# Patient Record
Sex: MALE | Race: WHITE | Employment: PART TIME | URBAN - METROPOLITAN AREA
[De-identification: names, ages, dates, MRNs, and addresses within clinical notes are randomized per-mention and may not be internally consistent; named-entity substitution may affect disease eponyms.]

---

## 2019-05-15 ENCOUNTER — APPOINTMENT (OUTPATIENT)
Dept: CT IMAGING | Age: 34
End: 2019-05-15
Payer: COMMERCIAL

## 2019-05-15 ENCOUNTER — HOSPITAL ENCOUNTER (EMERGENCY)
Age: 34
Discharge: HOME | End: 2019-05-15
Attending: EMERGENCY MEDICINE
Payer: COMMERCIAL

## 2019-05-15 VITALS
SYSTOLIC BLOOD PRESSURE: 125 MMHG | DIASTOLIC BLOOD PRESSURE: 80 MMHG | OXYGEN SATURATION: 97 % | WEIGHT: 160 LBS | HEIGHT: 74 IN | TEMPERATURE: 98.3 F | RESPIRATION RATE: 16 BRPM | HEART RATE: 91 BPM | BODY MASS INDEX: 20.53 KG/M2

## 2019-05-15 DIAGNOSIS — G44.209 TENSION HEADACHE: ICD-10-CM

## 2019-05-15 DIAGNOSIS — Z87.828 HISTORY OF HEAD INJURY: ICD-10-CM

## 2019-05-15 DIAGNOSIS — R10.9 FLANK PAIN: Primary | ICD-10-CM

## 2019-05-15 LAB
ALBUMIN SERPL-MCNC: 4 G/DL (ref 3.5–5.2)
ALP BLD-CCNC: 85 U/L (ref 40–129)
ALT SERPL-CCNC: 31 U/L (ref 0–40)
ANION GAP SERPL CALCULATED.3IONS-SCNC: 13 MMOL/L (ref 7–16)
AST SERPL-CCNC: 32 U/L (ref 0–39)
BACTERIA: ABNORMAL /HPF
BILIRUB SERPL-MCNC: 0.3 MG/DL (ref 0–1.2)
BILIRUBIN URINE: NEGATIVE
BLOOD, URINE: ABNORMAL
BUN BLDV-MCNC: 8 MG/DL (ref 6–20)
CALCIUM SERPL-MCNC: 9.3 MG/DL (ref 8.6–10.2)
CHLORIDE BLD-SCNC: 99 MMOL/L (ref 98–107)
CLARITY: CLEAR
CO2: 25 MMOL/L (ref 22–29)
COLOR: YELLOW
CREAT SERPL-MCNC: 0.9 MG/DL (ref 0.7–1.2)
GFR AFRICAN AMERICAN: >60
GFR NON-AFRICAN AMERICAN: >60 ML/MIN/1.73
GLUCOSE BLD-MCNC: 106 MG/DL (ref 74–99)
GLUCOSE URINE: NEGATIVE MG/DL
HCT VFR BLD CALC: 32.1 % (ref 37–54)
HEMOGLOBIN: 10.9 G/DL (ref 12.5–16.5)
KETONES, URINE: NEGATIVE MG/DL
LEUKOCYTE ESTERASE, URINE: ABNORMAL
MCH RBC QN AUTO: 28.1 PG (ref 26–35)
MCHC RBC AUTO-ENTMCNC: 34 % (ref 32–34.5)
MCV RBC AUTO: 82.7 FL (ref 80–99.9)
NITRITE, URINE: NEGATIVE
PDW BLD-RTO: 13.7 FL (ref 11.5–15)
PH UA: 6 (ref 5–9)
PLATELET # BLD: 269 E9/L (ref 130–450)
PMV BLD AUTO: 10.1 FL (ref 7–12)
POTASSIUM SERPL-SCNC: 3.2 MMOL/L (ref 3.5–5)
PROTEIN UA: 30 MG/DL
RBC # BLD: 3.88 E12/L (ref 3.8–5.8)
RBC UA: ABNORMAL /HPF (ref 0–2)
SODIUM BLD-SCNC: 137 MMOL/L (ref 132–146)
SPECIFIC GRAVITY UA: 1.02 (ref 1–1.03)
TOTAL PROTEIN: 7.8 G/DL (ref 6.4–8.3)
TROPONIN: <0.01 NG/ML (ref 0–0.03)
UROBILINOGEN, URINE: 1 E.U./DL
WBC # BLD: 9.3 E9/L (ref 4.5–11.5)
WBC UA: ABNORMAL /HPF (ref 0–5)
YEAST: ABNORMAL

## 2019-05-15 PROCEDURE — 99284 EMERGENCY DEPT VISIT MOD MDM: CPT

## 2019-05-15 PROCEDURE — 84484 ASSAY OF TROPONIN QUANT: CPT

## 2019-05-15 PROCEDURE — 2580000003 HC RX 258: Performed by: EMERGENCY MEDICINE

## 2019-05-15 PROCEDURE — 85027 COMPLETE CBC AUTOMATED: CPT

## 2019-05-15 PROCEDURE — 96374 THER/PROPH/DIAG INJ IV PUSH: CPT

## 2019-05-15 PROCEDURE — 36415 COLL VENOUS BLD VENIPUNCTURE: CPT

## 2019-05-15 PROCEDURE — 70450 CT HEAD/BRAIN W/O DYE: CPT

## 2019-05-15 PROCEDURE — 93005 ELECTROCARDIOGRAM TRACING: CPT | Performed by: NURSE PRACTITIONER

## 2019-05-15 PROCEDURE — 6360000002 HC RX W HCPCS: Performed by: EMERGENCY MEDICINE

## 2019-05-15 PROCEDURE — 80053 COMPREHEN METABOLIC PANEL: CPT

## 2019-05-15 PROCEDURE — 6370000000 HC RX 637 (ALT 250 FOR IP): Performed by: EMERGENCY MEDICINE

## 2019-05-15 PROCEDURE — 74176 CT ABD & PELVIS W/O CONTRAST: CPT

## 2019-05-15 PROCEDURE — 81001 URINALYSIS AUTO W/SCOPE: CPT

## 2019-05-15 RX ORDER — 0.9 % SODIUM CHLORIDE 0.9 %
1000 INTRAVENOUS SOLUTION INTRAVENOUS ONCE
Status: COMPLETED | OUTPATIENT
Start: 2019-05-15 | End: 2019-05-15

## 2019-05-15 RX ORDER — IBUPROFEN 400 MG/1
600 TABLET ORAL ONCE
Status: COMPLETED | OUTPATIENT
Start: 2019-05-15 | End: 2019-05-15

## 2019-05-15 RX ORDER — IBUPROFEN 600 MG/1
600 TABLET ORAL EVERY 8 HOURS PRN
Qty: 30 TABLET | Refills: 1 | Status: SHIPPED | OUTPATIENT
Start: 2019-05-15 | End: 2019-11-12 | Stop reason: ALTCHOICE

## 2019-05-15 RX ORDER — POTASSIUM CHLORIDE 20 MEQ/1
40 TABLET, EXTENDED RELEASE ORAL ONCE
Status: COMPLETED | OUTPATIENT
Start: 2019-05-15 | End: 2019-05-15

## 2019-05-15 RX ORDER — KETOROLAC TROMETHAMINE 30 MG/ML
30 INJECTION, SOLUTION INTRAMUSCULAR; INTRAVENOUS ONCE
Status: COMPLETED | OUTPATIENT
Start: 2019-05-15 | End: 2019-05-15

## 2019-05-15 RX ADMIN — IBUPROFEN 600 MG: 400 TABLET, FILM COATED ORAL at 19:05

## 2019-05-15 RX ADMIN — POTASSIUM CHLORIDE 40 MEQ: 20 TABLET, EXTENDED RELEASE ORAL at 19:04

## 2019-05-15 RX ADMIN — SODIUM CHLORIDE 1000 ML: 9 INJECTION, SOLUTION INTRAVENOUS at 17:22

## 2019-05-15 RX ADMIN — KETOROLAC TROMETHAMINE 30 MG: 30 INJECTION INTRAMUSCULAR; INTRAVENOUS at 17:22

## 2019-05-15 ASSESSMENT — PAIN DESCRIPTION - PAIN TYPE: TYPE: ACUTE PAIN

## 2019-05-15 ASSESSMENT — PAIN SCALES - GENERAL
PAINLEVEL_OUTOF10: 6
PAINLEVEL_OUTOF10: 7
PAINLEVEL_OUTOF10: 3

## 2019-05-15 ASSESSMENT — PAIN DESCRIPTION - LOCATION: LOCATION: FLANK;HEAD

## 2019-05-15 ASSESSMENT — PAIN DESCRIPTION - ORIENTATION: ORIENTATION: LEFT

## 2019-05-15 ASSESSMENT — PAIN DESCRIPTION - FREQUENCY: FREQUENCY: CONTINUOUS

## 2019-05-15 ASSESSMENT — PAIN DESCRIPTION - DESCRIPTORS: DESCRIPTORS: ACHING
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
## 2019-05-15 NOTE — LETTER
[facility] Emergency Department  [address] [facility] [address]  Phone: [phone]               May 15, 2019    Patient: Rick Zaldivar   YOB: 1985   Date of Visit: 5/15/2019       To Whom It May Concern:    Mya Zabala was seen and treated in our emergency department on 5/15/2019. He may return to work on 5/18/19.       Sincerely,       Susan Locke RN         Signature:__________________________________

## 2019-05-16 LAB
EKG ATRIAL RATE: 91 BPM
EKG P AXIS: 71 DEGREES
EKG P-R INTERVAL: 150 MS
EKG Q-T INTERVAL: 352 MS
EKG QRS DURATION: 100 MS
EKG QTC CALCULATION (BAZETT): 432 MS
EKG R AXIS: 82 DEGREES
EKG T AXIS: 55 DEGREES
EKG VENTRICULAR RATE: 91 BPM

## 2019-05-16 PROCEDURE — 93010 ELECTROCARDIOGRAM REPORT: CPT | Performed by: INTERNAL MEDICINE

## 2019-11-12 ENCOUNTER — HOSPITAL ENCOUNTER (EMERGENCY)
Age: 34
Discharge: HOME | End: 2019-11-12
Attending: EMERGENCY MEDICINE
Payer: COMMERCIAL

## 2019-11-12 VITALS
DIASTOLIC BLOOD PRESSURE: 84 MMHG | SYSTOLIC BLOOD PRESSURE: 134 MMHG | HEART RATE: 90 BPM | WEIGHT: 165 LBS | OXYGEN SATURATION: 99 % | TEMPERATURE: 98.4 F | HEIGHT: 73 IN | RESPIRATION RATE: 16 BRPM | BODY MASS INDEX: 21.87 KG/M2

## 2019-11-12 DIAGNOSIS — S76.311A STRAIN OF RIGHT HAMSTRING MUSCLE, INITIAL ENCOUNTER: Primary | ICD-10-CM

## 2019-11-12 PROCEDURE — 99282 EMERGENCY DEPT VISIT SF MDM: CPT

## 2019-11-12 RX ORDER — NAPROXEN 500 MG/1
500 TABLET ORAL 2 TIMES DAILY PRN
Qty: 14 TABLET | Refills: 0 | Status: SHIPPED | OUTPATIENT
Start: 2019-11-12 | End: 2021-01-13 | Stop reason: ALTCHOICE

## 2019-11-12 ASSESSMENT — PAIN SCALES - GENERAL: PAINLEVEL_OUTOF10: 4

## 2019-11-12 ASSESSMENT — PAIN DESCRIPTION - FREQUENCY: FREQUENCY: CONTINUOUS

## 2019-11-12 ASSESSMENT — PAIN DESCRIPTION - LOCATION: LOCATION: LEG

## 2019-11-12 ASSESSMENT — PAIN DESCRIPTION - ORIENTATION: ORIENTATION: RIGHT

## 2019-11-12 ASSESSMENT — PAIN DESCRIPTION - PAIN TYPE: TYPE: ACUTE PAIN

## 2019-11-12 ASSESSMENT — PAIN DESCRIPTION - DESCRIPTORS: DESCRIPTORS: ACHING;THROBBING

## 2020-01-03 ENCOUNTER — APPOINTMENT (OUTPATIENT)
Dept: GENERAL RADIOLOGY | Age: 35
End: 2020-01-03
Payer: COMMERCIAL

## 2020-01-03 ENCOUNTER — HOSPITAL ENCOUNTER (EMERGENCY)
Age: 35
Discharge: HOME | End: 2020-01-03
Payer: COMMERCIAL

## 2020-01-03 VITALS
TEMPERATURE: 98.8 F | SYSTOLIC BLOOD PRESSURE: 133 MMHG | WEIGHT: 170 LBS | HEART RATE: 104 BPM | RESPIRATION RATE: 14 BRPM | HEIGHT: 73 IN | DIASTOLIC BLOOD PRESSURE: 84 MMHG | OXYGEN SATURATION: 96 % | BODY MASS INDEX: 22.53 KG/M2

## 2020-01-03 PROCEDURE — 6370000000 HC RX 637 (ALT 250 FOR IP): Performed by: NURSE PRACTITIONER

## 2020-01-03 PROCEDURE — 73060 X-RAY EXAM OF HUMERUS: CPT

## 2020-01-03 PROCEDURE — 99283 EMERGENCY DEPT VISIT LOW MDM: CPT

## 2020-01-03 RX ORDER — IBUPROFEN 800 MG/1
800 TABLET ORAL ONCE
Status: COMPLETED | OUTPATIENT
Start: 2020-01-03 | End: 2020-01-03

## 2020-01-03 RX ADMIN — IBUPROFEN 800 MG: 800 TABLET, FILM COATED ORAL at 20:41

## 2020-01-03 ASSESSMENT — PAIN SCALES - GENERAL: PAINLEVEL_OUTOF10: 6

## 2020-01-04 NOTE — ED PROVIDER NOTES
[facility]   HPI:  1/3/20, Time: 7:37 PM         Lissette Awan is a 30 y.o. male presenting to the ED for right shoulder pain. Patient reports that on December 30 he had an Invega injection, and reports that since then he is having pain at the injection site. There is no unusual erythema, firmness, drainage and patient also denies fevers. Patient denies taking any Motrin or Tylenol denies applying ice. Patient also denies any numbness or tingling to that right arm and there is no unusual pallor or paresthesia. Review of Systems:   Pertinent positives and negatives are stated within HPI, all other systems reviewed and are negative.          --------------------------------------------- PAST HISTORY ---------------------------------------------  Past Medical History:  has a past medical history of ADD (attention deficit disorder) and Bipolar 1 disorder ([facility] [address] Past Surgical History:  has no past surgical history on file. Social History:  reports that he has been smoking cigarettes. He has been smoking about 0.50 packs per day. He has never used smokeless tobacco. He reports current alcohol use. He reports that he does not use drugs. Family History: family history is not on file. The patients home medications have been reviewed. Allergies: Patient has no known allergies. -------------------------------------------------- RESULTS -------------------------------------------------  All laboratory and radiology results have been personally reviewed by myself   LABS:  No results found for this visit on 01/03/20. RADIOLOGY:  Interpreted by Radiologist.  XR HUMERUS RIGHT (MIN 2 VIEWS)   Final Result   No fracture or dislocation.                      ------------------------- NURSING NOTES AND VITALS REVIEWED ---------------------------   The nursing notes within the ED encounter and vital signs as below have been reviewed.    Pulse 129   SpO2 94%   Oxygen Saturation Interpretation: Normal      ---------------------------------------------------PHYSICAL EXAM--------------------------------------      Constitutional/General: Alert and oriented x3, well appearing, non toxic in NAD  Head: Normocephalic and atraumatic  Eyes: PERRL, EOMI  Mouth: Oropharynx clear, handling secretions, no trismus  Neck: Supple, full ROM,   Pulmonary: Lungs clear to auscultation bilaterally, no wheezes, rales, or rhonchi. Not in respiratory distress  Cardiovascular:  Regular rate and rhythm, no murmurs, gallops, or rubs. 2+ distal pulses  Abdomen: Soft, non tender, non distended,   Extremities: Moves all extremities x 4. Warm and well perfused  Skin: warm and dry without rash, no unusual erythema, swelling, firmness, concerning abscess or any other acute infectious or etiology process identified related to the injection site. Patient without any streaking there is no warmth palpated either. Does have full motor movement. Right radial pulse strong at 2+. Neurologic: GCS 15,  Psych: Normal Affect      ------------------------------ ED COURSE/MEDICAL DECISION MAKING----------------------  Medications   ibuprofen (ADVIL;MOTRIN) tablet 800 mg (800 mg Oral Given 1/3/20 2041)         ED COURSE:       Medical Decision Making: We will obtain imaging to rule out any underlying infectious process. Patient without any erythema, joint swelling, warmth or any drainage or streaking noted. There is no suspicion of any abscess formation. X-ray negative, patient provided ice as well as Motrin. Patient made aware of good follow-up care likely irritation from injection site. Patient otherwise nontoxic, patient expressed understanding and safely discharged home       Counseling: The emergency provider has spoken with the patient and discussed todays results, in addition to providing specific details for the plan of care and counseling regarding the diagnosis and prognosis.   Questions are answered at this time and they are agreeable with the plan.      --------------------------------- IMPRESSION AND DISPOSITION ---------------------------------    IMPRESSION  1. Pain at injection site, initial encounter        DISPOSITION  Disposition: Discharge to home  Patient condition is good      NOTE: This report was transcribed using voice recognition software.  Every effort was made to ensure accuracy; however, inadvertent computerized transcription errors may be present     BardalesOC Saleem CNP  01/04/20 1837

## 2021-01-13 ENCOUNTER — HOSPITAL ENCOUNTER (EMERGENCY)
Age: 36
Discharge: HOME | End: 2021-01-13
Attending: EMERGENCY MEDICINE
Payer: COMMERCIAL

## 2021-01-13 VITALS
WEIGHT: 190 LBS | SYSTOLIC BLOOD PRESSURE: 112 MMHG | OXYGEN SATURATION: 98 % | HEIGHT: 74 IN | RESPIRATION RATE: 18 BRPM | DIASTOLIC BLOOD PRESSURE: 77 MMHG | BODY MASS INDEX: 24.38 KG/M2 | TEMPERATURE: 97.3 F | HEART RATE: 93 BPM

## 2021-01-13 DIAGNOSIS — R10.12 LEFT UPPER QUADRANT ABDOMINAL PAIN: Primary | ICD-10-CM

## 2021-01-13 LAB
ALBUMIN SERPL-MCNC: 4.4 G/DL (ref 3.5–5.2)
ALP BLD-CCNC: 73 U/L (ref 40–129)
ALT SERPL-CCNC: 12 U/L (ref 0–40)
ANION GAP SERPL CALCULATED.3IONS-SCNC: 9 MMOL/L (ref 7–16)
AST SERPL-CCNC: 18 U/L (ref 0–39)
BASOPHILS ABSOLUTE: 0.04 E9/L (ref 0–0.2)
BASOPHILS RELATIVE PERCENT: 0.8 % (ref 0–2)
BILIRUB SERPL-MCNC: 0.2 MG/DL (ref 0–1.2)
BUN BLDV-MCNC: 12 MG/DL (ref 6–20)
CALCIUM SERPL-MCNC: 9.8 MG/DL (ref 8.6–10.2)
CHLORIDE BLD-SCNC: 104 MMOL/L (ref 98–107)
CO2: 24 MMOL/L (ref 22–29)
CREAT SERPL-MCNC: 0.9 MG/DL (ref 0.7–1.2)
EOSINOPHILS ABSOLUTE: 0.02 E9/L (ref 0.05–0.5)
EOSINOPHILS RELATIVE PERCENT: 0.4 % (ref 0–6)
GFR AFRICAN AMERICAN: >60
GFR NON-AFRICAN AMERICAN: >60 ML/MIN/1.73
GLUCOSE BLD-MCNC: 100 MG/DL (ref 74–99)
HCT VFR BLD CALC: 39.1 % (ref 37–54)
HEMOGLOBIN: 13.2 G/DL (ref 12.5–16.5)
IMMATURE GRANULOCYTES #: 0.03 E9/L
IMMATURE GRANULOCYTES %: 0.6 % (ref 0–5)
LIPASE: 9 U/L (ref 13–60)
LYMPHOCYTES ABSOLUTE: 2.3 E9/L (ref 1.5–4)
LYMPHOCYTES RELATIVE PERCENT: 43.9 % (ref 20–42)
MCH RBC QN AUTO: 28.9 PG (ref 26–35)
MCHC RBC AUTO-ENTMCNC: 33.8 % (ref 32–34.5)
MCV RBC AUTO: 85.6 FL (ref 80–99.9)
MONOCYTES ABSOLUTE: 0.46 E9/L (ref 0.1–0.95)
MONOCYTES RELATIVE PERCENT: 8.8 % (ref 2–12)
NEUTROPHILS ABSOLUTE: 2.39 E9/L (ref 1.8–7.3)
NEUTROPHILS RELATIVE PERCENT: 45.5 % (ref 43–80)
PDW BLD-RTO: 13 FL (ref 11.5–15)
PLATELET # BLD: 211 E9/L (ref 130–450)
PMV BLD AUTO: 9.1 FL (ref 7–12)
POTASSIUM SERPL-SCNC: 4.4 MMOL/L (ref 3.5–5)
RBC # BLD: 4.57 E12/L (ref 3.8–5.8)
SODIUM BLD-SCNC: 137 MMOL/L (ref 132–146)
TOTAL PROTEIN: 7.5 G/DL (ref 6.4–8.3)
WBC # BLD: 5.2 E9/L (ref 4.5–11.5)

## 2021-01-13 PROCEDURE — 99284 EMERGENCY DEPT VISIT MOD MDM: CPT

## 2021-01-13 PROCEDURE — 96374 THER/PROPH/DIAG INJ IV PUSH: CPT

## 2021-01-13 PROCEDURE — 36415 COLL VENOUS BLD VENIPUNCTURE: CPT

## 2021-01-13 PROCEDURE — 80053 COMPREHEN METABOLIC PANEL: CPT

## 2021-01-13 PROCEDURE — 85025 COMPLETE CBC W/AUTO DIFF WBC: CPT

## 2021-01-13 PROCEDURE — 6360000002 HC RX W HCPCS: Performed by: EMERGENCY MEDICINE

## 2021-01-13 PROCEDURE — C9113 INJ PANTOPRAZOLE SODIUM, VIA: HCPCS | Performed by: EMERGENCY MEDICINE

## 2021-01-13 PROCEDURE — 2500000003 HC RX 250 WO HCPCS: Performed by: EMERGENCY MEDICINE

## 2021-01-13 PROCEDURE — 96375 TX/PRO/DX INJ NEW DRUG ADDON: CPT

## 2021-01-13 PROCEDURE — 83690 ASSAY OF LIPASE: CPT

## 2021-01-13 RX ORDER — OMEPRAZOLE 20 MG/1
20 CAPSULE, DELAYED RELEASE ORAL
Qty: 60 CAPSULE | Refills: 0 | Status: SHIPPED | OUTPATIENT
Start: 2021-01-13

## 2021-01-13 RX ORDER — PANTOPRAZOLE SODIUM 40 MG/10ML
40 INJECTION, POWDER, LYOPHILIZED, FOR SOLUTION INTRAVENOUS ONCE
Status: COMPLETED | OUTPATIENT
Start: 2021-01-13 | End: 2021-01-13

## 2021-01-13 RX ADMIN — PANTOPRAZOLE SODIUM 40 MG: 40 INJECTION, POWDER, FOR SOLUTION INTRAVENOUS at 16:51

## 2021-01-13 RX ADMIN — FAMOTIDINE 20 MG: 10 INJECTION INTRAVENOUS at 16:51

## 2021-01-13 ASSESSMENT — PAIN DESCRIPTION - DESCRIPTORS: DESCRIPTORS: ACHING

## 2021-01-13 ASSESSMENT — ENCOUNTER SYMPTOMS
WHEEZING: 0
SINUS PRESSURE: 0
BLOOD IN STOOL: 0
VOMITING: 0
SORE THROAT: 0
EYE REDNESS: 0
EYE PAIN: 0
COUGH: 0
EYE DISCHARGE: 0
CONSTIPATION: 0
DIARRHEA: 0
ANAL BLEEDING: 0
ABDOMINAL PAIN: 1
NAUSEA: 0
SHORTNESS OF BREATH: 0
BACK PAIN: 0

## 2021-01-13 ASSESSMENT — PAIN SCALES - GENERAL: PAINLEVEL_OUTOF10: 4

## 2021-01-13 ASSESSMENT — PAIN DESCRIPTION - FREQUENCY: FREQUENCY: INTERMITTENT

## 2021-01-13 ASSESSMENT — PAIN DESCRIPTION - ONSET: ONSET: ON-GOING

## 2021-01-13 NOTE — ED PROVIDER NOTES
Department of Emergency Medicine   ED  Provider Note  Admit Date/RoomTime: 1/13/2021  3:41 PM  ED Room: 05/05 1/13/21  4:10 PM EST    History of Present Illness:   Doroteo Siemens is a 37 y.o. male presenting to the ED for abdominal pain, beginning 3 days ago. The complaint has been intermittent, moderate in severity, and worsened by nothing. He reports since Sunday he has been having intermittent episodes of burning to left upper quadrant. He has never had this before. He has not tried taking medications for. He reports he does drink coffee but denies smoking or heavy NSAID use. He denies fevers, chills, nausea, vomiting, diarrhea, constipation, blood in stools, change in his urination or any other complaints        Review of Systems:   Pertinent positives and negatives are stated within HPI, all other systems reviewed and are negative. Review of Systems   Constitutional: Negative for chills and fever. HENT: Negative for ear pain, sinus pressure and sore throat. Eyes: Negative for pain, discharge and redness. Respiratory: Negative for cough, shortness of breath and wheezing. Cardiovascular: Negative for chest pain. Gastrointestinal: Positive for abdominal pain. Negative for anal bleeding, blood in stool, constipation, diarrhea, nausea and vomiting. Genitourinary: Negative for dysuria and frequency. Musculoskeletal: Negative for arthralgias and back pain. Skin: Negative for rash and wound. Neurological: Negative for weakness and headaches. Hematological: Negative for adenopathy. All other systems reviewed and are negative.           --------------------------------------------- PAST HISTORY ---------------------------------------------  Past Medical History:  has a past medical history of ADD (attention deficit disorder) and Bipolar 1 disorder ([facility] [address] Past Surgical History:  has no past surgical history on file.     Social History:  reports that he has been smoking cigarettes. He has been smoking about 0.50 packs per day. He has never used smokeless tobacco. He reports current alcohol use. He reports that he does not use drugs. Family History: family history is not on file. The patients home medications have been reviewed. Allergies: Patient has no known allergies. ------------------------- NURSING NOTES AND VITALS REVIEWED ---------------------------   The nursing notes within the ED encounter and vital signs as below have been reviewed. /77   Pulse 93   Temp 97.3 °F (36.3 °C) (Temporal)   Resp 18   Ht 6' 2\" (1.88 m)   Wt 190 lb (86.2 kg)   SpO2 98%   BMI 24.39 kg/m²   Oxygen Saturation Interpretation: Normal      ---------------------------------------------------PHYSICAL EXAM--------------------------------------    Physical Exam  Vitals signs and nursing note reviewed. Constitutional:       Appearance: He is well-developed. HENT:      Head: Normocephalic and atraumatic. Eyes:      Conjunctiva/sclera: Conjunctivae normal.   Neck:      Musculoskeletal: Normal range of motion and neck supple. Cardiovascular:      Rate and Rhythm: Normal rate and regular rhythm. Heart sounds: Normal heart sounds. No murmur. Pulmonary:      Effort: Pulmonary effort is normal. No respiratory distress. Breath sounds: Normal breath sounds. No wheezing or rales. Abdominal:      General: Bowel sounds are normal.      Palpations: Abdomen is soft. Tenderness: There is no abdominal tenderness. There is no guarding or rebound. Musculoskeletal:         General: No tenderness or deformity. Skin:     General: Skin is warm and dry. Neurological:      Mental Status: He is alert and oriented to person, place, and time. Cranial Nerves: No cranial nerve deficit.       Coordination: Coordination normal.            -------------------------------------------------- RESULTS -------------------------------------------------  All laboratory and

## 2021-07-14 ENCOUNTER — HOSPITAL ENCOUNTER (EMERGENCY)
Age: 36
Discharge: HOME | End: 2021-07-14
Payer: COMMERCIAL

## 2021-07-14 ENCOUNTER — APPOINTMENT (OUTPATIENT)
Dept: GENERAL RADIOLOGY | Age: 36
End: 2021-07-14
Payer: COMMERCIAL

## 2021-07-14 VITALS
RESPIRATION RATE: 16 BRPM | WEIGHT: 185 LBS | HEART RATE: 94 BPM | SYSTOLIC BLOOD PRESSURE: 121 MMHG | BODY MASS INDEX: 23.74 KG/M2 | TEMPERATURE: 98.2 F | OXYGEN SATURATION: 98 % | HEIGHT: 74 IN | DIASTOLIC BLOOD PRESSURE: 87 MMHG

## 2021-07-14 DIAGNOSIS — M54.50 ACUTE BILATERAL LOW BACK PAIN WITHOUT SCIATICA: ICD-10-CM

## 2021-07-14 DIAGNOSIS — S39.012A STRAIN OF LUMBAR REGION, INITIAL ENCOUNTER: Primary | ICD-10-CM

## 2021-07-14 PROCEDURE — 72100 X-RAY EXAM L-S SPINE 2/3 VWS: CPT

## 2021-07-14 PROCEDURE — 99283 EMERGENCY DEPT VISIT LOW MDM: CPT

## 2021-07-14 PROCEDURE — 6370000000 HC RX 637 (ALT 250 FOR IP): Performed by: PHYSICIAN ASSISTANT

## 2021-07-14 RX ORDER — NAPROXEN 500 MG/1
500 TABLET ORAL 2 TIMES DAILY
Qty: 60 TABLET | Refills: 0 | Status: SHIPPED | OUTPATIENT
Start: 2021-07-14

## 2021-07-14 RX ORDER — NAPROXEN 500 MG/1
500 TABLET ORAL 2 TIMES DAILY
Qty: 60 TABLET | Refills: 0 | Status: SHIPPED | OUTPATIENT
Start: 2021-07-14 | End: 2021-07-14 | Stop reason: SDUPTHER

## 2021-07-14 RX ORDER — NAPROXEN 500 MG/1
500 TABLET ORAL ONCE
Status: COMPLETED | OUTPATIENT
Start: 2021-07-14 | End: 2021-07-14

## 2021-07-14 RX ADMIN — NAPROXEN 500 MG: 500 TABLET ORAL at 16:52

## 2021-07-14 ASSESSMENT — PAIN SCALES - GENERAL: PAINLEVEL_OUTOF10: 5

## 2021-07-14 NOTE — ED PROVIDER NOTES
[facility]     Department of Emergency Medicine   ED  Provider Note  Admit Date/RoomTime: 7/14/2021  4:48 PM  ED Room: ST/-1     Chief Complaint:   Back Pain (lower middle back. Started on Wed. no injury.)    History of Present Illness      Last Flores is a 37 y.o. old male who presents to the ED for lower back pain that began on Wednesday. He denies any injury or trauma. He states the pain is worse with bending and standing from a sitting position. He denies any numbness/tingling or sensation changes. He has no radiation of the pain down to his legs and denies any urinary complaints. He has no loss of bowel or bladder, saddle paresthesias, testicular pain or swelling, abdominal pain, nausea, vomiting, diarrhea, rectal bleeding, dark/tarry stools, chest pain, shortness of breath, pain with breathing, rash, or limb swelling. He has no difficulty with ambulation or balance. Patient is alert and oriented x3 and in no apparent distress at this exam.  He is nontoxic-appearing. He denies any past history of back problems. Patient has not tried any over-the-counter medication. ROS   Pertinent positives and negatives are stated within HPI, all other systems reviewed and are negative. Past Medical History:  has a past medical history of ADD (attention deficit disorder) and Bipolar 1 disorder ([facility] [address] Past Surgical History:  has no past surgical history on file. Social History:  reports that he has been smoking cigarettes. He has been smoking about 0.50 packs per day. He has never used smokeless tobacco. He reports current alcohol use. He reports that he does not use drugs. Family History: family history is not on file. Allergies: Patient has no known allergies.     Physical Exam   VS:  /87   Pulse 94   Temp 98.2 °F (36.8 °C)   Resp 16   Ht 6' 2\" (1.88 m)   Wt 185 lb (83.9 kg)   SpO2 98%   BMI 23.75 kg/m²      Oxygen Saturation Interpretation: Normal.    Constitutional:  Alert and oriented x4, development consistent with age, NAD  HEENT:  NC/NT. No bruising or lacerations, Airway patent. Neck:  Normal ROM. Supple. No meningeal signs. Non-tender    Respiratory:  Clear to auscultation and breath sounds equal.  CV:  Regular rate and rhythm  GI:  Abdomen soft, nontender, non-distended, No firm or pulsatile mass. Back: middle and lower lumbar spine bilateral and midline. Tenderness: Mild. Swelling: no.              Range of Motion: full range with pain. Skin:  no erythema, rash or swelling noted. Distal Function:              Motor deficit: none. Sensory deficit: none. Pulse deficit: none. Extremities: Full ROM x4,  No edema or tenderness   Straight leg raising: negative bilaterally   Integument:  Normal turgor. Warm, dry, without visible rash. Neurological: CN II-XII grossly intact, Motor functions intact   Gait: normal.    Lab / Imaging Results   (All laboratory and radiology results have been personally reviewed by myself)  Labs:  No results found for this visit on 07/14/21. Imaging: All Radiology results interpreted by Radiologist unless otherwise noted. XR LUMBAR SPINE (2-3 VIEWS)   Final Result   No fracture or dislocation. ED Course / Medical Decision Making     Medications   naproxen (NAPROSYN) tablet 500 mg (500 mg Oral Given 7/14/21 1652)     Re-examination:  Patients symptoms are improving. Consult(s):  None    Procedure(s):  None    Medical Decision Making: At this time the patient is without objective evidence of an acute process requiring inpatient management. Counseling: The emergency provider has spoken with the patient/caregiver and discussed todays results, in addition to providing specific details for the plan of care and counseling regarding the diagnosis and prognosis. Questions are answered at this time and they are agreeable with the plan.   I discussed the differential, results and discharge plan with the patient and/or family/friend/caregiver if present. I emphasized the importance of follow-up with the physician I referred them to in the timeframe recommended. I explained reasons for the patient to return to the Emergency Department. Additional verbal discharge instructions were also given and discussed with the patient to supplement those generated by the EMR. We also discussed medications that were prescribed (if any) including common side effects and interactions. The patient was advised to abstain from driving, operating heavy machinery or making significant decisions while taking medications such as opiates and muscle relaxers that may impair this. All questions were addressed. They understand return precautions and discharge instructions. The patient and/or family/friend/caregiver expressed understanding. Vitals were stable and they were in no distress at discharge. Assessment      1. Strain of lumbar region, initial encounter    2. Acute bilateral low back pain without sciatica      Plan   Discharge to home  Patient condition is good    New Medications     New Prescriptions    NAPROXEN (NAPROSYN) 500 MG TABLET    Take 1 tablet by mouth 2 times daily       Electronically signed by Gilford Keller, PA-C   DD: 7/14/21  **This report was transcribed using voice recognition software. Every effort was made to ensure accuracy; however, inadvertent computerized transcription errors may be present.     END OF ED PROVIDER NOTE       Gilford Keller, PA-C  07/14/21 1813       Gilford Keller, PA-C  07/14/21 1816    ATTENDING PROVIDER ATTESTATION:     Supervising Physician, on-site, available for consultation, non-participatory in the evaluation or care of this patient           Gael Patrick MD  07/17/21 [id number]

## 2021-09-27 ENCOUNTER — HOSPITAL ENCOUNTER (OUTPATIENT)
Age: 36
Discharge: HOME | End: 2021-09-29
Payer: COMMERCIAL

## 2021-09-27 ENCOUNTER — HOSPITAL ENCOUNTER (OUTPATIENT)
Dept: GENERAL RADIOLOGY | Age: 36
Discharge: HOME | End: 2021-09-29
Payer: COMMERCIAL

## 2021-09-27 DIAGNOSIS — M54.50 LOW BACK PAIN, UNSPECIFIED BACK PAIN LATERALITY, UNSPECIFIED CHRONICITY, UNSPECIFIED WHETHER SCIATICA PRESENT: ICD-10-CM

## 2021-09-27 PROCEDURE — 72110 X-RAY EXAM L-2 SPINE 4/>VWS: CPT

## 2022-06-08 ENCOUNTER — HOSPITAL ENCOUNTER (EMERGENCY)
Age: 37
Discharge: HOME | End: 2022-06-08
Attending: EMERGENCY MEDICINE
Payer: COMMERCIAL

## 2022-06-08 VITALS
OXYGEN SATURATION: 99 % | SYSTOLIC BLOOD PRESSURE: 122 MMHG | TEMPERATURE: 99.1 F | RESPIRATION RATE: 16 BRPM | HEART RATE: 93 BPM | DIASTOLIC BLOOD PRESSURE: 84 MMHG

## 2022-06-08 DIAGNOSIS — F32.A DEPRESSION, UNSPECIFIED DEPRESSION TYPE: ICD-10-CM

## 2022-06-08 DIAGNOSIS — F41.1 ANXIETY STATE: Primary | ICD-10-CM

## 2022-06-08 LAB
ACETAMINOPHEN LEVEL: <5 MCG/ML (ref 10–30)
ALBUMIN SERPL-MCNC: 4.7 G/DL (ref 3.5–5.2)
ALP BLD-CCNC: 82 U/L (ref 40–129)
ALT SERPL-CCNC: 11 U/L (ref 0–40)
AMPHETAMINE SCREEN, URINE: NOT DETECTED
ANION GAP SERPL CALCULATED.3IONS-SCNC: 13 MMOL/L (ref 7–16)
AST SERPL-CCNC: 20 U/L (ref 0–39)
BARBITURATE SCREEN URINE: NOT DETECTED
BASOPHILS ABSOLUTE: 0.04 E9/L (ref 0–0.2)
BASOPHILS RELATIVE PERCENT: 0.5 % (ref 0–2)
BENZODIAZEPINE SCREEN, URINE: NOT DETECTED
BILIRUB SERPL-MCNC: 0.2 MG/DL (ref 0–1.2)
BUN BLDV-MCNC: 6 MG/DL (ref 6–20)
CALCIUM SERPL-MCNC: 9.2 MG/DL (ref 8.6–10.2)
CANNABINOID SCREEN URINE: NOT DETECTED
CHLORIDE BLD-SCNC: 104 MMOL/L (ref 98–107)
CO2: 21 MMOL/L (ref 22–29)
COCAINE METABOLITE SCREEN URINE: NOT DETECTED
CREAT SERPL-MCNC: 0.9 MG/DL (ref 0.7–1.2)
EOSINOPHILS ABSOLUTE: 0.02 E9/L (ref 0.05–0.5)
EOSINOPHILS RELATIVE PERCENT: 0.3 % (ref 0–6)
ETHANOL: <10 MG/DL (ref 0–0.08)
FENTANYL SCREEN, URINE: NOT DETECTED
GFR AFRICAN AMERICAN: >60
GFR NON-AFRICAN AMERICAN: >60 ML/MIN/1.73
GLUCOSE BLD-MCNC: 93 MG/DL (ref 74–99)
HCT VFR BLD CALC: 40.3 % (ref 37–54)
HEMOGLOBIN: 13.2 G/DL (ref 12.5–16.5)
IMMATURE GRANULOCYTES #: 0.03 E9/L
IMMATURE GRANULOCYTES %: 0.4 % (ref 0–5)
INFLUENZA A: NOT DETECTED
INFLUENZA B: NOT DETECTED
LYMPHOCYTES ABSOLUTE: 2.46 E9/L (ref 1.5–4)
LYMPHOCYTES RELATIVE PERCENT: 33.4 % (ref 20–42)
Lab: NORMAL
MCH RBC QN AUTO: 28.3 PG (ref 26–35)
MCHC RBC AUTO-ENTMCNC: 32.8 % (ref 32–34.5)
MCV RBC AUTO: 86.5 FL (ref 80–99.9)
METHADONE SCREEN, URINE: NOT DETECTED
MONOCYTES ABSOLUTE: 0.52 E9/L (ref 0.1–0.95)
MONOCYTES RELATIVE PERCENT: 7.1 % (ref 2–12)
NEUTROPHILS ABSOLUTE: 4.3 E9/L (ref 1.8–7.3)
NEUTROPHILS RELATIVE PERCENT: 58.3 % (ref 43–80)
OPIATE SCREEN URINE: NOT DETECTED
OXYCODONE URINE: NOT DETECTED
PDW BLD-RTO: 13.5 FL (ref 11.5–15)
PHENCYCLIDINE SCREEN URINE: NOT DETECTED
PLATELET # BLD: 266 E9/L (ref 130–450)
PMV BLD AUTO: 9.4 FL (ref 7–12)
POTASSIUM SERPL-SCNC: 4.5 MMOL/L (ref 3.5–5)
RBC # BLD: 4.66 E12/L (ref 3.8–5.8)
SALICYLATE, SERUM: <0.3 MG/DL (ref 0–30)
SARS-COV-2 RNA, RT PCR: NOT DETECTED
SODIUM BLD-SCNC: 138 MMOL/L (ref 132–146)
TOTAL PROTEIN: 7.4 G/DL (ref 6.4–8.3)
TRICYCLIC ANTIDEPRESSANTS SCREEN SERUM: NEGATIVE NG/ML
WBC # BLD: 7.4 E9/L (ref 4.5–11.5)

## 2022-06-08 PROCEDURE — 82077 ASSAY SPEC XCP UR&BREATH IA: CPT

## 2022-06-08 PROCEDURE — 80143 DRUG ASSAY ACETAMINOPHEN: CPT

## 2022-06-08 PROCEDURE — 87636 SARSCOV2 & INF A&B AMP PRB: CPT

## 2022-06-08 PROCEDURE — 80179 DRUG ASSAY SALICYLATE: CPT

## 2022-06-08 PROCEDURE — 99284 EMERGENCY DEPT VISIT MOD MDM: CPT

## 2022-06-08 PROCEDURE — 93005 ELECTROCARDIOGRAM TRACING: CPT | Performed by: EMERGENCY MEDICINE

## 2022-06-08 PROCEDURE — 85025 COMPLETE CBC W/AUTO DIFF WBC: CPT

## 2022-06-08 PROCEDURE — 80053 COMPREHEN METABOLIC PANEL: CPT

## 2022-06-08 PROCEDURE — 36415 COLL VENOUS BLD VENIPUNCTURE: CPT

## 2022-06-08 PROCEDURE — 80307 DRUG TEST PRSMV CHEM ANLYZR: CPT

## 2022-06-08 NOTE — ED NOTES
SW spoke to [facility], who accepted pt if he has his medications on him. SW confirmed that pt has his medications on him. Mom has been updated that pt is going to be going to [facility]. SW called [facility] and informed them that [facility] no male beds available and [facility] has accepted pt. SW was informed that they will call with an ETA for .           Pradeep Lenz, [place]  06/08/22 Yassine Jessica

## 2022-06-08 NOTE — ED NOTES
Behavioral Health Crisis Assessment    Chief Complaint:  \"FOR DEPRESSION\"    Mental Status Exam:  CALM, COOPERATIVE, ALERT, ORIENTED X'S 3, SHARED GOOD EYE CONTACT, HAS CLEAR SPEECH, COMMUNICATED EFFECTIVELY, HAS GOOD INSIGHT AND JUDGEMENT, THOUGHTS ARE LUCID, ADMITS TO SOME NON COMMANDING HALLUCINATIONS, DENIES SI AND NO HI.  HIGH FUNCTIONING WITH SOME OBVIOUS COGNITIVE DELAY, WELL MANNERED, SELF SUFFICIENT     Legal Status  [x] Voluntary:  [] Involuntary, Issued by:    Gender  [x] Male [] Female [] Transgender  [] Other    Sexual Orientation    [x] Heterosexual [] Homosexual [] Bisexual [] Other    Brief Clinical Summary:  PT PRESENTED AS A WALK IN WITH MOM AT BEDSIDE, SUPPORTIVE. PT LIVES AT HOME WITH MOM, NOT , HAS NO CHILDREN, WORKS FULL TIME. PT EXPLAINED THAT HE HAS BEEN FEELING DEPRESSED AND ANXIOUS WITH NO THOUGHTS, PLAN OR INTENT TO SELF HARM. PT DENIES HI, BUT ADMITS THAT HE HAS BEEN HEARING VOICES, TELLING HIM THAT HE IS WORTHLESS. HE ADMITS TO NON-VIOLENT MOOD SWINGS FOR THE PAST 2 WEEKS. VOICES ARE NON COMMANDING - NO VISUAL HALLUCINATIONS. PT TREATS WITH MS DANG AT [place] FOR BI-POLAR AND REPORTS THAT HE IS MED COMPLIANT. PT REPORTS THAT HE SPOKE TO MS DANG TODAY AND SHE ADVISED TO COME TO THE ER FOR A REFERRAL TO [facility]. PT'S LAST ADMISSION TO [facility] WAS 4 YEARS AGO. PT HAS A HX OF SUICIDE ATTEMPTS, LAST 15 YEARS AGO BY OD - WAS NOT HOSPITALIZED. Collateral Information:   MOM AT BEDSIDE, CONFIRMING PT'S REASON FOR COMING TO THE ED. SHE IS NOT HI LEGAL GUARDIAN \"YET\".     Risk Factors:  Mental Health Diagnosis  - BI-POLAR  1 Prior suicide attempts  Limited friends  Isolation    Protective Factors:  Strong family/friend support - MOM  Outpatient provider  Initiated this ER visit  Help-seeking behavior  Good insight TO [place] for the future  Safe and stable housing  Has access to essential needs  Medication compliant  EFFECTIVE communication Skills  Stable employment  Steady income  Active in the community  No access to weapons    Suicidal Ideations:   [] Reports:    [] Past [] Present   [x] Denies    Suicide Attempts:  [x] Reports:   [] Denies    C-SSRS Screening Completed by RN: Current Suicide Risk:  [x] None  [] Low [] Moderate [] High    Homicidal Ideations  [] Reports:   [] Past [] Present   [x] Denies     Self Injurious/Self Mutilation Behaviors:   [] Reports:    [] Past [] Present   [x] Denies    Hallucinations/Delusions   [x] Reports:   [] Denies     Substance Use/Alcohol Use/Addiction:   [] Reports:   [x] Denies   [x] SBIRT Screen Complete. Current or Past Substance Abuse Treatment  [] Yes, When and Where:  [x] No    Current or Past Mental Health Treatment:  [x] Yes, When and Where:  [facility]   [] No    Legal Issues:  []  Yes (Specify)  [x]  No    Access to Weapons:  []  Yes (Specify)  [x]  No    Trauma History  [] Reports:  [x] Denies     Living Situation:  AT HOME WITH MOM    Employment:  EMPLOYED AT [employer]      Education Level:  GRADUATED HIGH SCHOOL    Violence Risk Screenin. Have you ever thought about hurting someone? [x]  No  []  Yes (Ask the questions listed below)   When?  Did you follow through with the thoughts? [] No     [] Yes- When and what happened? 2.  Have you ever threatened anyone? [x]  No  []  Yes (Ask the questions listed below)   When and what happened?  Have you ever threatened someone with a gun, knife or other weapon? []  No  []  Yes - When and what happened? 2. Have you ever had an order of protection taken out against you? []  Yes []  No  3. Have you ever been arrested due to violence? []  Yes [x]  No  4. Have you ever been cruel to animals?  []  Yes [x]  No    After consideration of C-SSRS screening results, C-SSRS assessments, and this professional's assessment the patient's overall suicide risk assessed to be:  [x] None   [] Low   [] Moderate   [] High     [x] Discussed current suicide risk, protective and risk factors with RN and ED Physician     Disposition   [] Home:   [] Outpatient Provider:   [x] Crisis Unit:   [] Inpatient Psychiatric Unit:  [] Other:                     KRUPA Albarran  06/08/22 3950

## 2022-06-08 NOTE — ED PROVIDER NOTES
HPI:  6/8/22,   Time: 3:42 PM EDT         Yuriy Rolle is a 31 y.o. male presenting to the ED for anxiety and depression without suicidal or homicidal ideation, beginning 2 days ago. The complaint has been persistent, moderate in severity, and worsened by emotional upset. Patient states he has been depressed for 4 days and that 2 days ago he got an argument at work and his depression and anxiety has worsened since that time. He denies suicidal or homicidal ideation he does state he has some occasional auditory hallucinations. ROS:   Pertinent positives and negatives are stated within HPI, all other systems reviewed and are negative.  --------------------------------------------- PAST HISTORY ---------------------------------------------  Past Medical History:  has a past medical history of ADD (attention deficit disorder) and Bipolar 1 disorder ([facility] [address] Past Surgical History:  has no past surgical history on file. Social History:  reports that he has been smoking cigarettes. He has been smoking about 0.50 packs per day. He has never used smokeless tobacco. He reports current alcohol use. He reports that he does not use drugs. Family History: family history is not on file. The patients home medications have been reviewed. Allergies: Patient has no known allergies.     -------------------------------------------------- RESULTS -------------------------------------------------  All laboratory and radiology results have been personally reviewed by myself   LABS:  Results for orders placed or performed during the hospital encounter of 06/08/22   COVID-19 & Influenza Combo    Specimen: Nasopharyngeal Swab   Result Value Ref Range    SARS-CoV-2 RNA, RT PCR NOT DETECTED NOT DETECTED    INFLUENZA A NOT DETECTED NOT DETECTED    INFLUENZA B NOT DETECTED NOT DETECTED   CBC with Auto Differential   Result Value Ref Range    WBC 7.4 4.5 - 11.5 E9/L    RBC 4.66 3.80 - 5.80 E12/L    Hemoglobin 13.2 12.5 - 16.5 g/dL    Hematocrit 40.3 37.0 - 54.0 %    MCV 86.5 80.0 - 99.9 fL    MCH 28.3 26.0 - 35.0 pg    MCHC 32.8 32.0 - 34.5 %    RDW 13.5 11.5 - 15.0 fL    Platelets 563 699 - 585 E9/L    MPV 9.4 7.0 - 12.0 fL    Neutrophils % 58.3 43.0 - 80.0 %    Immature Granulocytes % 0.4 0.0 - 5.0 %    Lymphocytes % 33.4 20.0 - 42.0 %    Monocytes % 7.1 2.0 - 12.0 %    Eosinophils % 0.3 0.0 - 6.0 %    Basophils % 0.5 0.0 - 2.0 %    Neutrophils Absolute 4.30 1.80 - 7.30 E9/L    Immature Granulocytes # 0.03 E9/L    Lymphocytes Absolute 2.46 1.50 - 4.00 E9/L    Monocytes Absolute 0.52 0.10 - 0.95 E9/L    Eosinophils Absolute 0.02 (L) 0.05 - 0.50 E9/L    Basophils Absolute 0.04 0.00 - 0.20 E9/L   Comprehensive Metabolic Panel   Result Value Ref Range    Sodium 138 132 - 146 mmol/L    Potassium 4.5 3.5 - 5.0 mmol/L    Chloride 104 98 - 107 mmol/L    CO2 21 (L) 22 - 29 mmol/L    Anion Gap 13 7 - 16 mmol/L    Glucose 93 74 - 99 mg/dL    BUN 6 6 - 20 mg/dL    CREATININE 0.9 0.7 - 1.2 mg/dL    GFR Non-African American >60 >=60 mL/min/1.73    GFR African American >60     Calcium 9.2 8.6 - 10.2 mg/dL    Total Protein 7.4 6.4 - 8.3 g/dL    Albumin 4.7 3.5 - 5.2 g/dL    Total Bilirubin 0.2 0.0 - 1.2 mg/dL    Alkaline Phosphatase 82 40 - 129 U/L    ALT 11 0 - 40 U/L    AST 20 0 - 39 U/L   Serum Drug Screen   Result Value Ref Range    Ethanol Lvl <10 mg/dL    Acetaminophen Level <5.0 (L) 10.0 - 86.7 mcg/mL    Salicylate, Serum <2.4 0.0 - 30.0 mg/dL    TCA Scrn NEGATIVE Cutoff:300 ng/mL   URINE DRUG SCREEN   Result Value Ref Range    Amphetamine Screen, Urine NOT DETECTED Negative <1000 ng/mL    Barbiturate Screen, Ur NOT DETECTED Negative < 200 ng/mL    Benzodiazepine Screen, Urine NOT DETECTED Negative < 200 ng/mL    Cannabinoid Scrn, Ur NOT DETECTED Negative < 50ng/mL    Cocaine Metabolite Screen, Urine NOT DETECTED Negative < 300 ng/mL    Opiate Scrn, Ur NOT DETECTED Negative < 300ng/mL    PCP Screen, Urine NOT DETECTED Negative < 25 ng/mL Methadone Screen, Urine NOT DETECTED Negative <300 ng/mL    Oxycodone Urine NOT DETECTED Negative <100 ng/mL    FENTANYL SCREEN, URINE NOT DETECTED Negative <1 ng/mL    Drug Screen Comment: see below    EKG 12 Lead   Result Value Ref Range    Ventricular Rate 82 BPM    Atrial Rate 82 BPM    P-R Interval 162 ms    QRS Duration 94 ms    Q-T Interval 348 ms    QTc Calculation (Bazett) 406 ms    P Axis 62 degrees    R Axis 78 degrees    T Axis 41 degrees       RADIOLOGY:  Interpreted by Radiologist.  No orders to display       ------------------------- NURSING NOTES AND VITALS REVIEWED ---------------------------   The nursing notes within the ED encounter and vital signs as below have been reviewed. /80   Pulse 91   Temp 98 °F (36.7 °C)   Resp 18   SpO2 98%   Oxygen Saturation Interpretation: Normal      ---------------------------------------------------PHYSICAL EXAM--------------------------------------      Constitutional/General: Alert and oriented x3, well appearing, non toxic in NAD  Head: NC/AT  Eyes: PERRL, EOMI  Mouth: Oropharynx clear, handling secretions, no trismus  Neck: Supple, full ROM, no meningeal signs  Pulmonary: Lungs clear to auscultation bilaterally, no wheezes, rales, or rhonchi. Not in respiratory distress  Cardiovascular:  Regular rate and rhythm, no murmurs, gallops, or rubs. 2+ distal pulses  Abdomen: Soft, non tender, non distended,   Extremities: Moves all extremities x 4. Warm and well perfused  Skin: warm and dry without rash  Neurologic: GCS 15, nerves intact 5 full-strength normal gait no pronator drift no Romberg sign  Psych: Flat affect depressed mood poor eye contact Limited judgment Limited insight poverty of speech content loose associations and poor communication verbally      ------------------------------ ED COURSE/MEDICAL DECISION MAKING----------------------  Medications - No data to display    EKG: This EKG is signed and interpreted by me.     Rate: 82  Rhythm: Sinus  Interpretation: no acute changes  Comparison: no previous EKG available    Medical Decision Making:    Patient was observed in the emergency department he was medically cleared and he will be dispositioned per social work    Counseling: The emergency provider has spoken with the patient and discussed todays results, in addition to providing specific details for the plan of care and counseling regarding the diagnosis and prognosis. Questions are answered at this time and they are agreeable with the plan.      --------------------------------- IMPRESSION AND DISPOSITION ---------------------------------    IMPRESSION  1. Anxiety state    2.  Depression, unspecified depression type        DISPOSITION  Disposition: Other Disposition: Per   Patient condition is stable                  Rick Dnois MD  06/08/22 1543

## 2022-06-08 NOTE — ED NOTES
Department of Emergency Medicine  FIRST PROVIDER TRIAGE NOTE             Independent MLP           6/8/22  1:11 PM EDT    Date of Encounter: 6/8/22   MRN: [id number]      HPI: Meryle Littler is a 31 y.o. male who presents to the ED for Depression (x 2 days denies SI/HI) and Anxiety    ROS: Negative for cp, sob, Suicidal ideation or Homicidal Ideation. PE: Gen Appearance/Constitutional: alert  Musculoskeletal: moves all extremities x 4     Initial Plan of Care: All treatment areas with department are currently occupied.  Plan to order/Initiate the following while awaiting opening in ED: labs and Social Work Eval.  Initiate Treatment-Testing, Proceed toTreatment Area When [employer] Available for ED Attending/MLP to [employer] signed by OC Dang CNP   DD: 6/8/22       OC Olivas CNP  06/08/22 1051

## 2022-06-08 NOTE — ED NOTES
Yamilet from [facility] called and will accept pt if mother can bring his medications to the hospital.     RACHAEL called mom [place] [employer] [phone]) left voicemail    RACHAEL will call and update Yamilet at [phone] once confirmed medication will be dropped off to pt.       Juan Ramon Ambrosio [place]  06/08/22 5624

## 2022-06-08 NOTE — ED NOTES
Denies SI/HI. Agreeable to discharge to the [facility]. Discharge instructions reviewed with patient and he denies any questions at this time.       Christa Kaur RN  06/08/22 1927

## 2022-06-09 LAB
EKG ATRIAL RATE: 82 BPM
EKG P AXIS: 62 DEGREES
EKG P-R INTERVAL: 162 MS
EKG Q-T INTERVAL: 348 MS
EKG QRS DURATION: 94 MS
EKG QTC CALCULATION (BAZETT): 406 MS
EKG R AXIS: 78 DEGREES
EKG T AXIS: 41 DEGREES
EKG VENTRICULAR RATE: 82 BPM

## 2023-02-07 ENCOUNTER — NURSE TRIAGE (OUTPATIENT)
Dept: OTHER | Facility: CLINIC | Age: 38
End: 2023-02-07

## 2023-02-07 NOTE — TELEPHONE ENCOUNTER
Location of patient:[facility]    Received call from Isma donaldson at [employer] with [employer]. Subjective: Caller states \"I had stomach problems for awhile now. A few days ago it has been worse. \"     Current Symptoms: upper mid abd pain, intermittent. Intermittent lightheaded. Onset: 3 days ago; worsening    Associated Symptoms: denies n/v. +diarrhea, 1 episode past 24 hours. Pain Severity: 7/10; sharp; intermittent. Episodes 5-15 minutes each episode. Temperature: denies     What has been tried: nothing    LMP: NA Pregnant: NA    Recommended disposition: See in Office Today or Tomorrow    Care advice provided, patient verbalizes understanding; denies any other questions or concerns; instructed to call back for any new or worsening symptoms. Patient/Caller agrees with recommended disposition; writer provided warm transfer to [facility] for appointment scheduling    Attention Provider: Thank you for allowing me to participate in the care of your patient. The patient was connected to triage in response to information provided to the ECC/PSC. Please do not respond through this encounter as the response is not directed to a shared pool.         Reason for Disposition   MILD TO MODERATE pain that comes and goes (cramps) lasts > 24 hours    Protocols used: Abdominal Pain - Upper-ADULT-[place]

## 2023-02-08 ENCOUNTER — OFFICE VISIT (OUTPATIENT)
Dept: INTERNAL MEDICINE | Age: 38
End: 2023-02-08
Payer: COMMERCIAL

## 2023-02-08 VITALS
TEMPERATURE: 98.3 F | DIASTOLIC BLOOD PRESSURE: 80 MMHG | BODY MASS INDEX: 24.52 KG/M2 | HEART RATE: 100 BPM | WEIGHT: 185 LBS | SYSTOLIC BLOOD PRESSURE: 125 MMHG | RESPIRATION RATE: 18 BRPM | HEIGHT: 73 IN | OXYGEN SATURATION: 99 %

## 2023-02-08 DIAGNOSIS — R19.8 SYMPTOMS OF GASTROESOPHAGEAL REFLUX: Primary | ICD-10-CM

## 2023-02-08 DIAGNOSIS — F20.9 SCHIZOPHRENIA, UNSPECIFIED TYPE (HCC): ICD-10-CM

## 2023-02-08 PROCEDURE — 99203 OFFICE O/P NEW LOW 30 MIN: CPT

## 2023-02-08 PROCEDURE — 99202 OFFICE O/P NEW SF 15 MIN: CPT

## 2023-02-08 PROCEDURE — G8484 FLU IMMUNIZE NO ADMIN: HCPCS

## 2023-02-08 PROCEDURE — G8427 DOCREV CUR MEDS BY ELIG CLIN: HCPCS

## 2023-02-08 PROCEDURE — G8420 CALC BMI NORM PARAMETERS: HCPCS

## 2023-02-08 PROCEDURE — 4004F PT TOBACCO SCREEN RCVD TLK: CPT

## 2023-02-08 RX ORDER — OMEPRAZOLE 40 MG/1
40 CAPSULE, DELAYED RELEASE ORAL
Qty: 90 CAPSULE | Refills: 2 | Status: SHIPPED | OUTPATIENT
Start: 2023-02-08

## 2023-02-08 SDOH — ECONOMIC STABILITY: FOOD INSECURITY: WITHIN THE PAST 12 MONTHS, YOU WORRIED THAT YOUR FOOD WOULD RUN OUT BEFORE YOU GOT MONEY TO BUY MORE.: SOMETIMES TRUE

## 2023-02-08 SDOH — ECONOMIC STABILITY: HOUSING INSECURITY
IN THE LAST 12 MONTHS, WAS THERE A TIME WHEN YOU DID NOT HAVE A STEADY PLACE TO SLEEP OR SLEPT IN A SHELTER (INCLUDING NOW)?: NO

## 2023-02-08 SDOH — ECONOMIC STABILITY: FOOD INSECURITY: WITHIN THE PAST 12 MONTHS, THE FOOD YOU BOUGHT JUST DIDN'T LAST AND YOU DIDN'T HAVE MONEY TO GET MORE.: SOMETIMES TRUE

## 2023-02-08 SDOH — ECONOMIC STABILITY: INCOME INSECURITY: HOW HARD IS IT FOR YOU TO PAY FOR THE VERY BASICS LIKE FOOD, HOUSING, MEDICAL CARE, AND HEATING?: SOMEWHAT HARD

## 2023-02-08 ASSESSMENT — PATIENT HEALTH QUESTIONNAIRE - PHQ9
2. FEELING DOWN, DEPRESSED OR HOPELESS: 0
SUM OF ALL RESPONSES TO PHQ QUESTIONS 1-9: 0
SUM OF ALL RESPONSES TO PHQ QUESTIONS 1-9: 0
1. LITTLE INTEREST OR PLEASURE IN DOING THINGS: 0
SUM OF ALL RESPONSES TO PHQ9 QUESTIONS 1 & 2: 0
SUM OF ALL RESPONSES TO PHQ QUESTIONS 1-9: 0
SUM OF ALL RESPONSES TO PHQ QUESTIONS 1-9: 0

## 2023-02-08 ASSESSMENT — ENCOUNTER SYMPTOMS
SORE THROAT: 0
ABDOMINAL DISTENTION: 0
NAUSEA: 0
EYE DISCHARGE: 0
SINUS PAIN: 0
VOMITING: 0
COUGH: 0
SHORTNESS OF BREATH: 0
CHEST TIGHTNESS: 0
TROUBLE SWALLOWING: 0
DIARRHEA: 1
ABDOMINAL PAIN: 1
BLOOD IN STOOL: 0
CONSTIPATION: 0
VOICE CHANGE: 0
EYE REDNESS: 0

## 2023-02-08 NOTE — PATIENT INSTRUCTIONS
Dear Vic Abdul,    Thank you for coming to your appointment today. I hope we have addressed all of your needs. Please make sure to do the following:  - Continue your medications as listed. - We will see each other again in 10 weeks    Call for a sooner appointment if you develop worsening symptoms, blood in stool    Have a great day!     Sincerely,  Sweetie Fox M.D  2/8/2023  3:23 PM

## 2023-02-08 NOTE — PROGRESS NOTES
[address]  Internal Medicine Residency Program  [facility] Note      CC: had concerns including New Patient (Upper mid epigastric discomfort x several days , denies nausea and vomiting has diarrhea it comes and goes ). Nayana Oconnell has a PMHx of schizophrenia, bipolar, ADD, anxiety and depression presented to the [facility] for a follow up    Hx of anxiety, depression w/o SI or HI  Occasional auditory hallucinations   ADD, ritalin  Bipolar , zyprexa   Hx schizophrenia  Follows [facility], was there around 1/30/23 2/7/23 - stomach problems that worsened over a few days. Mid epigastric, intermittent w/ lightheadedness. Started 2/4, no N/V, 7/10, sharp, intermittent that lasts 5-15 min. Was sweaty. Tachcyardic. Chest pain lasted a few seconds. Comes and goes, lasts a few minutes. Lasts 3 min then comes an hour later. A little watery, every other day. Honey, garlic, in hot water and that helps. Fatty food makes it worst. Sleeps throught he night. Better when laying down. Says it has been getting better over the last few days. Will give a trial of omeprazole 40 and re-evaluate. If still having the problems will test for H. Pylori. CT abd/pelvis 5/2019 no acute process      Health Care Maintenance:   Denies vaccines     Things to follow up:  trial of omeprazole 40 and re-evaluate. If still having the problems will test for H. Pylori. Recent visits/labs:  2/7/23 - stomach problems that worsened over a few days. Mid epigastric, intermittent w/ lightheadedness. Started 2/4, no N/V, 7/10, sharp, intermittent that lasts 5-15 min. Labs and Imaging:       ASSESSMENT/PLAN:  1. Symptoms of gastroesophageal reflux  -     omeprazole (PRILOSEC) 40 MG delayed release capsule; Take 1 capsule by mouth every morning (before breakfast), Disp-90 capsule, R-2Normal  2. Schizophrenia, unspecified type ([facility] [address]  Overview:   Blair Munoz, was there around 1/30/23  Gets his cogentin and zyprexa from there SUBJECTIVE:  Review of Systems   Constitutional:  Positive for diaphoresis. Negative for appetite change, chills, fatigue, fever and unexpected weight change. HENT:  Negative for congestion, sinus pain, sneezing, sore throat, trouble swallowing and voice change. Eyes:  Negative for discharge and redness. Respiratory:  Negative for cough, chest tightness and shortness of breath. Cardiovascular:  Negative for chest pain, palpitations and leg swelling. Gastrointestinal:  Positive for abdominal pain (epigastric) and diarrhea (every other day). Negative for abdominal distention, blood in stool, constipation, nausea and vomiting. Genitourinary:  Negative for dysuria and frequency. Neurological:  Negative for dizziness, weakness, light-headedness and headaches. Psychiatric/Behavioral:  Negative for agitation, behavioral problems, confusion, self-injury and suicidal ideas. The patient is not nervous/anxious. I have reviewed all pertinent PMHx, PSHx, FamHx, SocialHx, medications, and allergies and updated history as appropriate. OBJECTIVE:    VS:   /80 (Site: Left Upper Arm, Position: Sitting, Cuff Size: Medium Adult)   Pulse 100   Temp 98.3 °F (36.8 °C) (Oral)   Resp 18   Ht 6' 1\" (1.854 m)   Wt 185 lb (83.9 kg)   SpO2 99%   BMI 24.41 kg/m²     Physical Exam  Constitutional:       Appearance: Normal appearance. HENT:      Mouth/Throat:      Mouth: Mucous membranes are moist.      Pharynx: No oropharyngeal exudate or posterior oropharyngeal erythema. Cardiovascular:      Rate and Rhythm: Regular rhythm. Tachycardia present. Pulses: Normal pulses. Heart sounds: Normal heart sounds. No murmur heard. No gallop. Pulmonary:      Effort: Pulmonary effort is normal.      Breath sounds: Normal breath sounds. No wheezing or rales. Abdominal:      General: Bowel sounds are normal. There is no distension. Palpations: Abdomen is soft. There is no mass.       Tenderness: There is abdominal tenderness (mid epigastric). Comments: Neg Frankel sign   Musculoskeletal:      Right lower leg: No edema. Left lower leg: No edema. Neurological:      General: No focal deficit present. Mental Status: He is alert and oriented to person, place, and time. Mental status is at baseline. Psychiatric:      Comments: Flat affect         Current Outpatient Medications on File Prior to Visit   Medication Sig Dispense Refill    naproxen (NAPROSYN) 500 MG tablet Take 1 tablet by mouth 2 times daily 60 tablet 0    OLANZapine (ZYPREXA) 20 MG tablet Take 20 mg by mouth nightly      benztropine (COGENTIN) 2 MG tablet Take 2 mg by mouth daily       No current facility-administered medications on file prior to visit.        Outpatient Medications Marked as Taking for the 2/8/23 encounter (Office Visit) with Kaylin Fish MD   Medication Sig Dispense Refill    omeprazole (PRILOSEC) 40 MG delayed release capsule Take 1 capsule by mouth every morning (before breakfast) 90 capsule 2    naproxen (NAPROSYN) 500 MG tablet Take 1 tablet by mouth 2 times daily 60 tablet 0    OLANZapine (ZYPREXA) 20 MG tablet Take 20 mg by mouth nightly      benztropine (COGENTIN) 2 MG tablet Take 2 mg by mouth daily         I have reviewed my findings and recommendations with Cindy Carpio and Dr. Anibal Ray MD PGY-1   2/8/2023 3:41 PM

## 2023-02-08 NOTE — PROGRESS NOTES
[address]  Internal Medicine Residency Clinic    Attending Physician Statement  I have discussed the case, including pertinent history and exam findings with the resident physician. I have seen and examined the patient and the key elements of the encounter have been performed by me. I agree with the assessment, plan and orders as documented by the resident. I have reviewed all pertinent PMHx, PSHx, FamHx, SocialHx, medications, and allergies and updated history as appropriate. Patient here for new patient appointment to establish care. (+) epigastric pain exacerbated by fatty foods but no blood stools, no n/v, no weight loss. (+) epigastric tenderness (-) mercedes's (-) rebound tenderness. No frequent nsaid use. Ct abdomen 2019 normal. Did have RUQ imaging ordered in 2021 but not done. Seen in ED 2021 for RUQ pain which resolved with pepcid and protonix. Will do trial of PPI for possible reflux. If persistent symptoms, test for H pylori. Remainder of medical problems as per resident note. [place] MD  2/8/2023 3:10 PM

## 2023-02-15 ENCOUNTER — OFFICE VISIT (OUTPATIENT)
Dept: INTERNAL MEDICINE | Age: 38
End: 2023-02-15
Payer: COMMERCIAL

## 2023-02-15 VITALS
DIASTOLIC BLOOD PRESSURE: 80 MMHG | BODY MASS INDEX: 24.65 KG/M2 | SYSTOLIC BLOOD PRESSURE: 126 MMHG | OXYGEN SATURATION: 98 % | WEIGHT: 186 LBS | HEART RATE: 93 BPM | TEMPERATURE: 97.2 F | RESPIRATION RATE: 16 BRPM | HEIGHT: 73 IN

## 2023-02-15 DIAGNOSIS — F20.9 SCHIZOPHRENIA, UNSPECIFIED TYPE (HCC): ICD-10-CM

## 2023-02-15 DIAGNOSIS — R19.8 SYMPTOMS OF GASTROESOPHAGEAL REFLUX: Primary | ICD-10-CM

## 2023-02-15 PROCEDURE — 99212 OFFICE O/P EST SF 10 MIN: CPT

## 2023-02-15 RX ORDER — PALIPERIDONE PALMITATE 234 MG/1.5ML
INJECTION INTRAMUSCULAR
COMMUNITY
Start: 2023-01-30

## 2023-02-15 ASSESSMENT — ENCOUNTER SYMPTOMS
DIARRHEA: 0
NAUSEA: 0
CONSTIPATION: 0
ABDOMINAL PAIN: 1
RESPIRATORY NEGATIVE: 1
ABDOMINAL DISTENTION: 0

## 2023-02-15 NOTE — PATIENT INSTRUCTIONS
Abdominal Pain  We will draw blood to check for H. Pylori, lipase, TSH, CBC, CMP, lipid panel  Please continue to take omeprazole 40 mg as instructied    We will follow-up with you in 2 weeks for abdominal pain

## 2023-02-15 NOTE — LETTER
2/15/2023        [address] [place] [address] [employer] [id number]      EXCUSE FROM WORK OR SCHOOL    To Whom It May Concern:    Shan Suresh, date of birth 1985, is seen today at [facility]. Please excuse him from work for 2/15/2023. Please have the contact our office if there are questions or concerns.       Sincerely,      Jennifer Luong MD

## 2023-02-15 NOTE — PROGRESS NOTES
[place] [facility]      SUBJECTIVE:  Darnell Peterson (:  1985) is a 30 y.o. male here for evaluation of the following chief complaint(s):  Abdominal Pain (Patient states he has pain and discomfort that goes from center of abdomen to back , medication at times gives him relief but all the time, and states he does have some nausea, pain comes and goes whether pt had food or not, pt feels it worsen since last visit)      Previous Visit Imp Points:   Seen for distress. Pain started on 2023. No n/v, lasts 5-15 min. Exacerbated by fatty foods. Endorses diarrhea watery every other day. He was given trial omeprazole 40 mg plan for twice daily. HPI:   Mr. Laura Shankar is a 43-year-old male past medical history of schizophrenia, bipolar, ADHD, depression. He was seen previously on 2023 for abdominal pain suspected for GERD. Here for follow-up. He was given 40 mg trial omeprazole. Patient states that the medication sometimes helps. His abdominal pain is epigastric, 6-7/10 in severity, dull / aching in sensation. No associated burning. Uncorrelated with food. Does not get worse with fatty foods. Pain is notably worse at night. Patient also states that is patient radiates to the back, but back pain is reproducible with palpation at the T8 location (he works as a ). Patient has been taking olanzapine and benztropine for 15 years. Abdominal pain has been present for 5 weeks, low suspicion for medication induced. Key points to note:  - H pylori antigen testing, CBC, CMP for concern GERD  - Lipase, Lipid panel to r/o possible pancreatitis      Review of Systems   Constitutional: Negative. HENT: Negative. Respiratory: Negative. Gastrointestinal:  Positive for abdominal pain. Negative for abdominal distention, constipation, diarrhea and nausea. Genitourinary: Negative.         PMHx:  has a past medical history of ADD (attention deficit disorder) and Bipolar 1 disorder (HCC).     No Known Allergies     PSHx:  has no past surgical history on file.     Social Hx:   Social History       Tobacco History       Smoking Status  Every Day Smoking Frequency  0.50 packs/day for 8.00 years (4.00 pk-yrs) Smoking Tobacco Type  Cigarettes      Smokeless Tobacco Use  Never      Tobacco Comments  Smokes 8-9 cigarettes / day              Alcohol History       Alcohol Use Status  Yes Comment  occasionally              Drug Use       Drug Use Status  No              Sexual Activity       Sexually Active  Not Asked                     Fam Hx: No family history on file.           Current Outpatient Medications on File Prior to Visit   Medication Sig Dispense Refill    omeprazole (PRILOSEC) 40 MG delayed release capsule Take 1 capsule by mouth every morning (before breakfast) 90 capsule 2    naproxen (NAPROSYN) 500 MG tablet Take 1 tablet by mouth 2 times daily 60 tablet 0    OLANZapine (ZYPREXA) 20 MG tablet Take 20 mg by mouth nightly      benztropine (COGENTIN) 2 MG tablet Take 2 mg by mouth daily      INVEGA SUSTENNA 234 MG/1.5ML GENNA IM injection [facility]       No current facility-administered medications on file prior to visit.       OBJECTIVE:    VS:   Vitals:    02/15/23 1423   BP: 126/80   Site: Right Upper Arm   Position: Sitting   Cuff Size: Medium Adult   Pulse: 93   Resp: 16   Temp: 97.2 °F (36.2 °C)   TempSrc: Temporal   SpO2: 98%   Weight: 186 lb (84.4 kg)   Height: 6' 1\" (1.854 m)     Physical Exam  Constitutional:       Appearance: Normal appearance.   HENT:      Head: Normocephalic and atraumatic.      Nose: Nose normal.   Eyes:      Extraocular Movements: Extraocular movements intact.      Pupils: Pupils are equal, round, and reactive to light.   Cardiovascular:      Rate and Rhythm: Normal rate and regular rhythm.      Pulses: Normal pulses.      Heart sounds: Normal heart sounds.   Pulmonary:      Effort: Pulmonary effort is normal.      Breath sounds: Normal breath sounds. Abdominal:      General: Abdomen is flat. Bowel sounds are normal.      Palpations: Abdomen is soft. Tenderness: There is abdominal tenderness. Musculoskeletal:         General: Tenderness present. Cervical back: Normal range of motion. Comments: Paraspinal tenderness to palpation at T8 level   Neurological:      Mental Status: He is alert. ASSESSMENT/PLAN:  1. Symptoms of gastroesophageal reflux  -     H. Pylori Antibody, IgG; Future  -     Comprehensive Metabolic Panel; Future  -     CBC with Auto Differential; Future  -     TSH; Future  -     Lipase; Future  2. Schizophrenia, unspecified type ([facility] [address]  -     LIPID PANEL; Future       RTC:  Return in about 2 weeks (around 3/1/2023), or Dyspepsia. I have reviewed my findings and recommendations with Jeff Eugene and Dr. Peggi Holter.     Lawrence Barba MD   2/15/2023 2:53 PM

## 2023-02-15 NOTE — PROGRESS NOTES
[address]  Internal Medicine Residency Clinic  Attending Physician Statement:  Kaylene Horne M.D., F.A.C.P. I have discussed the case, including pertinent history and exam findings with the resident. Time spent with review of medical records/labs- last visits, coordinating care with residents, nurses and patient. Addressed when applicable- Health maintenance issues of vaccinations, depression screening, tobacco cessation    Billiing assessed by medical complexity of case  Patient is seen for fu visit today. -- acute and chronic problems addressed    Abd pain is chronic:  abdomen 2019 normal. Did have RUQ imaging ordered in 2021 but not done. Seen in ED 2021 for RUQ pain which resolved with pepcid and protonix. Will do trial of PPI for possible reflux    PPI helpful for dyspepsia like symptoms  Trial -- occasionally  Helpful for stomach area pain  Never been treat for HPylori  - no labs in past 8 months, check cmp (liver/biliary), cbc (rule out gib), Hypylori ab  +lipase +,   +lipid panel and thyroid      Occasional auditory hallucinations   ADD, ritalin  Bipolar , zyprexa   Hx schizophrenia  Follow[facility], was there around 1/30/23  Psych meds, cogentin and xyprexa (been on for 15 years - doubt related to abd syptoms)      Tenderness to paraspinal muscles T8  (Old CTabd/ back personallyl reviewed)  Mechanical back pain, seems -- stable, observation  I agree with the assessment, plan and orders as documented by the resident.

## 2023-04-19 ENCOUNTER — OFFICE VISIT (OUTPATIENT)
Dept: INTERNAL MEDICINE | Age: 38
End: 2023-04-19
Payer: COMMERCIAL

## 2023-04-19 VITALS
HEART RATE: 102 BPM | OXYGEN SATURATION: 96 % | HEIGHT: 73 IN | TEMPERATURE: 98.8 F | BODY MASS INDEX: 24.82 KG/M2 | DIASTOLIC BLOOD PRESSURE: 73 MMHG | WEIGHT: 187.3 LBS | SYSTOLIC BLOOD PRESSURE: 119 MMHG | RESPIRATION RATE: 18 BRPM

## 2023-04-19 DIAGNOSIS — R19.8 SYMPTOMS OF GASTROESOPHAGEAL REFLUX: ICD-10-CM

## 2023-04-19 DIAGNOSIS — F20.9 SCHIZOPHRENIA, UNSPECIFIED TYPE (HCC): Primary | ICD-10-CM

## 2023-04-19 DIAGNOSIS — M25.512 ACUTE PAIN OF LEFT SHOULDER: ICD-10-CM

## 2023-04-19 DIAGNOSIS — Z11.4 SCREENING FOR HIV WITHOUT PRESENCE OF RISK FACTORS: ICD-10-CM

## 2023-04-19 PROCEDURE — 99212 OFFICE O/P EST SF 10 MIN: CPT

## 2023-04-19 RX ORDER — PREDNISONE 10 MG/1
TABLET ORAL
COMMUNITY

## 2023-04-19 NOTE — PROGRESS NOTES
[address]  Internal Medicine Residency Clinic  Attending Physician Statement:  Jeanna Hirsch M.D., F.A.C.P. Patient is seen for fu visit today. -- acute and chronic problems addressed  Addressed when applicable- Health maintenance issues of vaccinations, social determinants/depression/CA screening, tobacco cessation etc... I have discussed the case, including pertinent history and exam findings with the resident, review of last visit medical records/labs-   I agree with the assessment, plan and orders as documented by the resident.    -Melly Stockton assessed by medical complexity of case  My assessment of high points of todays visit as follows:        On meds with [facility] for psych stable  High risk meds for metabolic syndrome  - he didn't get requested labs    Same tylenol etc. For shoulder and mechanical pain complaints

## 2023-04-19 NOTE — PATIENT INSTRUCTIONS
Dear Phillip Downs,    Thank you for coming to your appointment today. I hope we have addressed all of your needs. Please make sure to do the following:  - Continue your medications as listed. - Get labs drawn before our next follow up. - We will see each other again in 6 months    Call for a sooner appointment if you develop any issues or if the shoulder and knee pain isn't going away    Have a great day!     Sincerely,  Jayme Andrade M.D  4/19/2023  1:28 PM Patient was advised to take all meds as directed , and to follow up regularly , to bring all meds each time she is coming to see me, medication SE discussed  . RTC as needed   ER warnings reviewed

## 2023-04-19 NOTE — PROGRESS NOTES
[address]  Internal Medicine Residency Program  [facility] Note      CC: had concerns including Joint Swelling (Patient c/o left knee swelling for about 3 weeks. Patient describes pain as achy. ) and Back Pain (Patient c/o left side neck/ top of left shoulder pain. Patient describes pain as achy. ). Michelle Reid has a PMHx of schizophrenia, bipolar, ADD, anxiety and depression, GERD presented to the [facility] for a follow up    Intermittent 3/10 left knee pain, with left shoulder pain for a few weeks, denies any trauma. Tylenol kind of helps, worse after working long days as . Agreeable to continue tylenol PRN and diclofenac gel    Hx of anxiety, depression w/o SI or HI. Denies any suicidal or homicidal ideation today     Follows [facility], was there around 4/17/23 2/7/23 - stomach problems that worsened over a few days. Mid epigastric, intermittent w/ lightheadedness. Started 2/4, no N/V, 7/10, sharp, intermittent that lasts 5-15 min. Was sweaty. Tachcyardic. Chest pain lasted a few seconds. Comes and goes, lasts a few minutes. Lasts 3 min then comes an hour later. A little watery, every other day. Honey, garlic, in hot water and that helps. Fatty food makes it worst. Sleeps throught he night. Better when laying down. Says it has been getting better over the last few days. Will give a trial of omeprazole 40 and re-evaluate. Feeling much better. No more abdominal pain     Work letter provided. Health Care Maintenance:   Declines vaccines     Things to follow up:  Left shoulder and knee pain, likely try physical therapy if pain isn't going away  Did he get the labs drawn     Recent visits/labs:  Came back 1 week after trying trial of omeprazole with abd pain, ordered CMP, CBC, TSH, lipase    Labs and Imaging:   Labs have not been completed     ASSESSMENT/PLAN:  1. Schizophrenia, unspecified type ([facility] [address]  Overview:   Follows [facility], was there

## 2024-03-18 NOTE — LETTER
[facility] Internal [facility]  [address]  [facility] [place] [address]  Phone: [phone]  Fax: [phone]    Tomas Salinas MD        February 8, 2023     Patient: Konstantin Barrett   YOB: 1985   Date of Visit: 2/8/2023       To Whom it May Concern:    Karina Yancey was seen in my clinic on 2/8/2023. He may return to work on 2/9/23. If you have any questions or concerns, please don't hesitate to call.     Sincerely,         Tomas Salinas MD
[facility] Internal Medicine  [address] [place] [address]  [place] [address]  Phone: [phone]  Fax: [phone]    Jaelyn Flores MD        February 8, 2023     Patient: Ricky Maradiaga   YOB: 1985   Date of Visit: 2/8/2023       To Whom it May Concern:    Yasmin Alcantara was seen in my clinic on 2/8/2023. He may return to work on 2/9/23. If you have any questions or concerns, please don't hesitate to call.     Sincerely,         Jaelyn Flores MD
Not applicable

## 2024-07-01 ENCOUNTER — HOSPITAL ENCOUNTER (INPATIENT)
Age: 39
LOS: 7 days | Discharge: HOME | End: 2024-07-08
Attending: EMERGENCY MEDICINE | Admitting: PSYCHIATRY & NEUROLOGY
Payer: COMMERCIAL

## 2024-07-01 DIAGNOSIS — F23 ACUTE PSYCHOSIS (HCC): Primary | ICD-10-CM

## 2024-07-01 LAB
ALBUMIN SERPL-MCNC: 4.6 G/DL (ref 3.5–5.2)
ALP SERPL-CCNC: 84 U/L (ref 40–129)
ALT SERPL-CCNC: 13 U/L (ref 0–40)
AMPHET UR QL SCN: NEGATIVE
ANION GAP SERPL CALCULATED.3IONS-SCNC: 23 MMOL/L (ref 7–16)
APAP SERPL-MCNC: <5 UG/ML (ref 10–30)
AST SERPL-CCNC: 23 U/L (ref 0–39)
BARBITURATES UR QL SCN: NEGATIVE
BASOPHILS # BLD: 0.06 K/UL (ref 0–0.2)
BASOPHILS NFR BLD: 1 % (ref 0–2)
BENZODIAZ UR QL: NEGATIVE
BILIRUB SERPL-MCNC: 0.2 MG/DL (ref 0–1.2)
BUN SERPL-MCNC: 8 MG/DL (ref 6–20)
BUPRENORPHINE UR QL: NEGATIVE
CALCIUM SERPL-MCNC: 9.5 MG/DL (ref 8.6–10.2)
CANNABINOIDS UR QL SCN: POSITIVE
CHLORIDE SERPL-SCNC: 99 MMOL/L (ref 98–107)
CO2 SERPL-SCNC: 16 MMOL/L (ref 22–29)
COCAINE UR QL SCN: NEGATIVE
CREAT SERPL-MCNC: 1 MG/DL (ref 0.7–1.2)
EKG ATRIAL RATE: 113 BPM
EKG P AXIS: 72 DEGREES
EKG P-R INTERVAL: 146 MS
EKG Q-T INTERVAL: 344 MS
EKG QRS DURATION: 108 MS
EKG QTC CALCULATION (BAZETT): 471 MS
EKG R AXIS: 85 DEGREES
EKG T AXIS: 65 DEGREES
EKG VENTRICULAR RATE: 113 BPM
EOSINOPHIL # BLD: 0.02 K/UL (ref 0.05–0.5)
EOSINOPHILS RELATIVE PERCENT: 0 % (ref 0–6)
ERYTHROCYTE [DISTWIDTH] IN BLOOD BY AUTOMATED COUNT: 13.8 % (ref 11.5–15)
ETHANOLAMINE SERPL-MCNC: <10 MG/DL (ref 0–0.08)
FENTANYL UR QL: NEGATIVE
GFR, ESTIMATED: >90 ML/MIN/1.73M2
GLUCOSE SERPL-MCNC: 159 MG/DL (ref 74–99)
HCT VFR BLD AUTO: 37.1 % (ref 37–54)
HGB BLD-MCNC: 11.9 G/DL (ref 12.5–16.5)
IMM GRANULOCYTES # BLD AUTO: 0.05 K/UL (ref 0–0.58)
IMM GRANULOCYTES NFR BLD: 1 % (ref 0–5)
LYMPHOCYTES NFR BLD: 4.63 K/UL (ref 1.5–4)
LYMPHOCYTES RELATIVE PERCENT: 48 % (ref 20–42)
MCH RBC QN AUTO: 27.9 PG (ref 26–35)
MCHC RBC AUTO-ENTMCNC: 32.1 G/DL (ref 32–34.5)
MCV RBC AUTO: 86.9 FL (ref 80–99.9)
METHADONE UR QL: NEGATIVE
MONOCYTES NFR BLD: 0.94 K/UL (ref 0.1–0.95)
MONOCYTES NFR BLD: 10 % (ref 2–12)
NEUTROPHILS NFR BLD: 41 % (ref 43–80)
NEUTS SEG NFR BLD: 3.88 K/UL (ref 1.8–7.3)
OPIATES UR QL SCN: NEGATIVE
OXYCODONE UR QL SCN: NEGATIVE
PCP UR QL SCN: NEGATIVE
PLATELET # BLD AUTO: 223 K/UL (ref 130–450)
PMV BLD AUTO: 9.8 FL (ref 7–12)
POTASSIUM SERPL-SCNC: 3.4 MMOL/L (ref 3.5–5)
PROT SERPL-MCNC: 7.4 G/DL (ref 6.4–8.3)
RBC # BLD AUTO: 4.27 M/UL (ref 3.8–5.8)
SALICYLATES SERPL-MCNC: <0.3 MG/DL (ref 0–30)
SODIUM SERPL-SCNC: 138 MMOL/L (ref 132–146)
TEST INFORMATION: ABNORMAL
TOXIC TRICYCLIC SC,BLOOD: NEGATIVE
WBC OTHER # BLD: 9.6 K/UL (ref 4.5–11.5)

## 2024-07-01 PROCEDURE — 96372 THER/PROPH/DIAG INJ SC/IM: CPT

## 2024-07-01 PROCEDURE — 80053 COMPREHEN METABOLIC PANEL: CPT

## 2024-07-01 PROCEDURE — 93005 ELECTROCARDIOGRAM TRACING: CPT | Performed by: EMERGENCY MEDICINE

## 2024-07-01 PROCEDURE — 2500000003 HC RX 250 WO HCPCS

## 2024-07-01 PROCEDURE — 85025 COMPLETE CBC W/AUTO DIFF WBC: CPT

## 2024-07-01 PROCEDURE — 80179 DRUG ASSAY SALICYLATE: CPT

## 2024-07-01 PROCEDURE — 2580000003 HC RX 258: Performed by: EMERGENCY MEDICINE

## 2024-07-01 PROCEDURE — 93010 ELECTROCARDIOGRAM REPORT: CPT | Performed by: INTERNAL MEDICINE

## 2024-07-01 PROCEDURE — 99285 EMERGENCY DEPT VISIT HI MDM: CPT

## 2024-07-01 PROCEDURE — 80143 DRUG ASSAY ACETAMINOPHEN: CPT

## 2024-07-01 PROCEDURE — 1240000000 HC EMOTIONAL WELLNESS R&B

## 2024-07-01 PROCEDURE — G0480 DRUG TEST DEF 1-7 CLASSES: HCPCS

## 2024-07-01 PROCEDURE — 80307 DRUG TEST PRSMV CHEM ANLYZR: CPT

## 2024-07-01 RX ORDER — KETAMINE HYDROCHLORIDE 100 MG/ML
200 INJECTION, SOLUTION INTRAMUSCULAR; INTRAVENOUS ONCE
Status: COMPLETED | OUTPATIENT
Start: 2024-07-01 | End: 2024-07-01

## 2024-07-01 RX ORDER — 0.9 % SODIUM CHLORIDE 0.9 %
1000 INTRAVENOUS SOLUTION INTRAVENOUS ONCE
Status: COMPLETED | OUTPATIENT
Start: 2024-07-01 | End: 2024-07-01

## 2024-07-01 RX ORDER — POTASSIUM CHLORIDE 20 MEQ/1
40 TABLET, EXTENDED RELEASE ORAL ONCE
Status: DISCONTINUED | OUTPATIENT
Start: 2024-07-01 | End: 2024-07-08 | Stop reason: HOSPADM

## 2024-07-01 RX ADMIN — SODIUM CHLORIDE 1000 ML: 9 INJECTION, SOLUTION INTRAVENOUS at 05:22

## 2024-07-01 RX ADMIN — KETAMINE HYDROCHLORIDE 200 MG: 100 INJECTION INTRAMUSCULAR; INTRAVENOUS at 03:35

## 2024-07-01 ASSESSMENT — LIFESTYLE VARIABLES
HOW OFTEN DO YOU HAVE A DRINK CONTAINING ALCOHOL: NEVER
HOW MANY STANDARD DRINKS CONTAINING ALCOHOL DO YOU HAVE ON A TYPICAL DAY: PATIENT DOES NOT DRINK

## 2024-07-01 ASSESSMENT — PAIN - FUNCTIONAL ASSESSMENT: PAIN_FUNCTIONAL_ASSESSMENT: NONE - DENIES PAIN
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
## 2024-07-01 NOTE — ED NOTES
Received report from ARUN Cuellar who stated patient did not have IV fluids infusing on arrival to Section G, actual stop time unknown.

## 2024-07-01 NOTE — ED PROVIDER NOTES
[facility] EMERGENCY DEPARTMENT  EMERGENCY DEPARTMENT ENCOUNTER        Pt Name: Iam Cagle  MRN: [id number]  Birthdate 1985  Date of evaluation: 7/1/2024  Provider: Elise Adams MD  PCP: Kaushal Rojo MD  Note Started: 3:40 AM EDT 7/1/24    CHIEF COMPLAINT       Chief Complaint   Patient presents with    Psychiatric Evaluation     Parents called for patient having a psychotic break. Hx of schizophrenia and has been drinking and smoking tonight. Given ketamine PTA       HISTORY OF PRESENT ILLNESS: 1 or more Elements   History From: EMS    Limitations to history : None    Iam Cagle is a 38 y.o. male with a history of bipolar 1, ADD, schizophrenia who presents from home for psychotic break.  Patient's parents called EMS.  He was given ketamine IM prior to arrival by EMS.  Unable to give a history at this time    Nursing Notes were all reviewed and agreed with or any disagreements were addressed in the HPI.        REVIEW OF EXTERNAL NOTE :       Patient was last seen on 8/10/2023 for depressive disorder, adult health examination, tobacco dependence, left lower quadrant pain, constipation, family history of stroke    Patient was seen in office on 4/19/2023 for schizophrenia, GERD, HIV screening, acute pain of left shoulder    REVIEW OF SYSTEMS :           Positives and Pertinent negatives as per HPI.     SURGICAL HISTORY   No past surgical history on file.    CURRENTMEDICATIONS       Previous Medications    BENZTROPINE (COGENTIN) 2 MG TABLET    Take 1 tablet by mouth daily    DICLOFENAC SODIUM (VOLTAREN) 1 % GEL    Apply 2 g topically 4 times daily    INVEGA SUSTENNA 234 MG/1.5ML GENNA IM INJECTION    [facility]    NAPROXEN (NAPROSYN) 500 MG TABLET    Take 1 tablet by mouth 2 times daily    OLANZAPINE (ZYPREXA) 20 MG TABLET    Take 1 tablet by mouth nightly    OMEPRAZOLE (PRILOSEC) 40 MG DELAYED RELEASE CAPSULE    Take 1 capsule by mouth every morning  by EMS.  Upon arrival to the emergency department the patient is still agitated.  The patient was put on 4 points restraints and given additional IM ketamine.  The patient was pink slipped.  His initial vitals showed blood pressure of 141/83, heart rate 103.  Vitals otherwise unremarkable.  Differentials include but not limited to acute psychosis, drug use, electrolyte abnormalities, EKG changes.  CBC is unremarkable.  CMP shows potassium 3.4, bicarb 16, anion gap 23, glucose 159.  Serum drug screen is negative.  Urine drug screen is positive for cannabinoids.  EKG shows nonspecific T wave abnormalities that are stable with prior.  The patient was given 1 L bolus of fluids.  On reexamination patient remains somewhat sedated.  His vitals have remained stable.  4 point restraints were removed.  The patient is medically cleared for social work evaluation.      CONSULTS: (Who and What was discussed)  None      I am the Primary Clinician of Record.    FINAL IMPRESSION      1. Acute psychosis (HCC)          DISPOSITION/PLAN     DISPOSITION Behavioral Health Hold 07/01/2024 04:42:49 AM      PATIENT REFERRED TO:  No follow-up provider specified.    DISCHARGE MEDICATIONS:  New Prescriptions    No medications on file       DISCONTINUED MEDICATIONS:  Discontinued Medications    No medications on file              (Please note that portions of this note were completed with a voice recognition program.  Efforts were made to edit the dictations but occasionally words are mis-transcribed.)    Elise Adams MD (electronically signed)

## 2024-07-01 NOTE — ED NOTES
Patient resting with eyes closed on arrival to Section G.  Respirations even and unlabored no distress noted. No IV running on arrival to section G.

## 2024-07-02 PROBLEM — F23 ACUTE PSYCHOSIS (HCC): Status: RESOLVED | Noted: 2024-07-01 | Resolved: 2024-07-02

## 2024-07-02 PROBLEM — F12.10 CANNABIS ABUSE: Status: ACTIVE | Noted: 2024-07-02

## 2024-07-02 PROBLEM — F20.0 ACUTE EXACERBATION OF CHRONIC PARANOID SCHIZOPHRENIA (HCC): Status: ACTIVE | Noted: 2024-07-02

## 2024-07-02 PROCEDURE — 6370000000 HC RX 637 (ALT 250 FOR IP): Performed by: NURSE PRACTITIONER

## 2024-07-02 PROCEDURE — 90792 PSYCH DIAG EVAL W/MED SRVCS: CPT | Performed by: NURSE PRACTITIONER

## 2024-07-02 PROCEDURE — 1240000000 HC EMOTIONAL WELLNESS R&B

## 2024-07-02 RX ORDER — HALOPERIDOL 5 MG/ML
5 INJECTION INTRAMUSCULAR EVERY 6 HOURS PRN
Status: DISCONTINUED | OUTPATIENT
Start: 2024-07-02 | End: 2024-07-08 | Stop reason: HOSPADM

## 2024-07-02 RX ORDER — OLANZAPINE 10 MG/1
20 TABLET ORAL NIGHTLY
Status: DISCONTINUED | OUTPATIENT
Start: 2024-07-02 | End: 2024-07-08 | Stop reason: HOSPADM

## 2024-07-02 RX ORDER — LORAZEPAM 2 MG/ML
2 INJECTION INTRAMUSCULAR EVERY 6 HOURS PRN
Status: DISCONTINUED | OUTPATIENT
Start: 2024-07-02 | End: 2024-07-08 | Stop reason: HOSPADM

## 2024-07-02 RX ORDER — POLYETHYLENE GLYCOL 3350 17 G
2 POWDER IN PACKET (EA) ORAL
Status: DISCONTINUED | OUTPATIENT
Start: 2024-07-02 | End: 2024-07-08 | Stop reason: HOSPADM

## 2024-07-02 RX ORDER — HYDROXYZINE PAMOATE 50 MG/1
50 CAPSULE ORAL 3 TIMES DAILY PRN
Status: DISCONTINUED | OUTPATIENT
Start: 2024-07-02 | End: 2024-07-08 | Stop reason: HOSPADM

## 2024-07-02 RX ORDER — MAGNESIUM HYDROXIDE/ALUMINUM HYDROXICE/SIMETHICONE 120; 1200; 1200 MG/30ML; MG/30ML; MG/30ML
30 SUSPENSION ORAL PRN
Status: DISCONTINUED | OUTPATIENT
Start: 2024-07-02 | End: 2024-07-08 | Stop reason: HOSPADM

## 2024-07-02 RX ORDER — BENZTROPINE MESYLATE 2 MG/1
2 TABLET ORAL DAILY
Status: DISCONTINUED | OUTPATIENT
Start: 2024-07-02 | End: 2024-07-08 | Stop reason: HOSPADM

## 2024-07-02 RX ORDER — DIPHENHYDRAMINE HYDROCHLORIDE 50 MG/ML
50 INJECTION INTRAMUSCULAR; INTRAVENOUS EVERY 6 HOURS PRN
Status: DISCONTINUED | OUTPATIENT
Start: 2024-07-02 | End: 2024-07-08 | Stop reason: HOSPADM

## 2024-07-02 RX ORDER — HALOPERIDOL 5 MG/1
5 TABLET ORAL EVERY 6 HOURS PRN
Status: DISCONTINUED | OUTPATIENT
Start: 2024-07-02 | End: 2024-07-08 | Stop reason: HOSPADM

## 2024-07-02 RX ORDER — NICOTINE 21 MG/24HR
1 PATCH, TRANSDERMAL 24 HOURS TRANSDERMAL DAILY
Status: DISCONTINUED | OUTPATIENT
Start: 2024-07-02 | End: 2024-07-02

## 2024-07-02 RX ORDER — ACETAMINOPHEN 325 MG/1
650 TABLET ORAL EVERY 6 HOURS PRN
Status: DISCONTINUED | OUTPATIENT
Start: 2024-07-02 | End: 2024-07-08 | Stop reason: HOSPADM

## 2024-07-02 RX ORDER — LANOLIN ALCOHOL/MO/W.PET/CERES
3 CREAM (GRAM) TOPICAL NIGHTLY PRN
Status: DISCONTINUED | OUTPATIENT
Start: 2024-07-02 | End: 2024-07-08 | Stop reason: HOSPADM

## 2024-07-02 RX ORDER — DIVALPROEX SODIUM 250 MG/1
250 TABLET, DELAYED RELEASE ORAL EVERY 12 HOURS SCHEDULED
Status: DISCONTINUED | OUTPATIENT
Start: 2024-07-02 | End: 2024-07-08 | Stop reason: HOSPADM

## 2024-07-02 RX ADMIN — BENZTROPINE MESYLATE 2 MG: 2 TABLET ORAL at 18:58

## 2024-07-02 RX ADMIN — DIVALPROEX SODIUM 250 MG: 250 TABLET, DELAYED RELEASE ORAL at 21:17

## 2024-07-02 RX ADMIN — OLANZAPINE 20 MG: 10 TABLET, FILM COATED ORAL at 21:17

## 2024-07-02 ASSESSMENT — SLEEP AND FATIGUE QUESTIONNAIRES
AVERAGE NUMBER OF SLEEP HOURS: 8
SLEEP PATTERN: NORMAL
DO YOU HAVE DIFFICULTY SLEEPING: YES
AVERAGE NUMBER OF SLEEP HOURS: 5
DO YOU USE A SLEEP AID: NO
DO YOU USE A SLEEP AID: NO
SLEEP PATTERN: INSOMNIA
DO YOU HAVE DIFFICULTY SLEEPING: NO

## 2024-07-02 ASSESSMENT — PATIENT HEALTH QUESTIONNAIRE - PHQ9
1. LITTLE INTEREST OR PLEASURE IN DOING THINGS: NOT AT ALL
SUM OF ALL RESPONSES TO PHQ QUESTIONS 1-9: 0
1. LITTLE INTEREST OR PLEASURE IN DOING THINGS: NOT AT ALL
SUM OF ALL RESPONSES TO PHQ QUESTIONS 1-9: 0
SUM OF ALL RESPONSES TO PHQ QUESTIONS 1-9: 2
SUM OF ALL RESPONSES TO PHQ QUESTIONS 1-9: 0
SUM OF ALL RESPONSES TO PHQ QUESTIONS 1-9: 2
SUM OF ALL RESPONSES TO PHQ9 QUESTIONS 1 & 2: 0
2. FEELING DOWN, DEPRESSED OR HOPELESS: MORE THAN HALF THE DAYS
2. FEELING DOWN, DEPRESSED OR HOPELESS: NOT AT ALL
SUM OF ALL RESPONSES TO PHQ QUESTIONS 1-9: 0
SUM OF ALL RESPONSES TO PHQ9 QUESTIONS 1 & 2: 2
SUM OF ALL RESPONSES TO PHQ QUESTIONS 1-9: 2
SUM OF ALL RESPONSES TO PHQ QUESTIONS 1-9: 2

## 2024-07-02 ASSESSMENT — LIFESTYLE VARIABLES
HOW OFTEN DO YOU HAVE A DRINK CONTAINING ALCOHOL: MONTHLY OR LESS
HOW OFTEN DO YOU HAVE A DRINK CONTAINING ALCOHOL: NEVER
HOW MANY STANDARD DRINKS CONTAINING ALCOHOL DO YOU HAVE ON A TYPICAL DAY: 1 OR 2
HOW MANY STANDARD DRINKS CONTAINING ALCOHOL DO YOU HAVE ON A TYPICAL DAY: PATIENT DOES NOT DRINK

## 2024-07-02 ASSESSMENT — PAIN SCALES - GENERAL: PAINLEVEL_OUTOF10: 0
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
## 2024-07-02 NOTE — ED NOTES
Telephone report called to floor, spoke to ARUN Kevin. T removed all patient's belongings from locker for transport. Transport requested.

## 2024-07-02 NOTE — ED NOTES
This RN received call from LD Sheikh who stated patient is being accepted to [address] [place] [address]. Admission orders entered.  Sharla has been notified.

## 2024-07-02 NOTE — ED NOTES
[facility] - [facility] requested a weight and covid test for [facility].     Section G RN made aware of request.

## 2024-07-02 NOTE — CARE COORDINATION
Biopsychosocial Assessment Note    Social work met with patient to complete the biopsychosocial assessment and C-SSRS.     Chief Complaint: pt stated that he is currently in the hospital because \"I am here for my medical reasons.\" Pt then stated that his medical reasons include his bipolar disorder, however pt was unable to elaborate further.     Mental Status Exam: Pt is alert and oriented x3, pt is not alert to situation. Pt's mood is depressed, affect is flat and blunt. Pt's speech is muffled, rate is slow and volume is low, pt was very difficult to understand. Pt's eye contact is poor. Pt's thoughts process is blocked, thought content is delayed, pt has poverty of content.. Pt's memory is impaired, pt is a poor recent historian. Pt's insight and judgement is poor. Pt was guarded and evasive during assessment however pt offered relevant information. Pt denied current SI, HI, AVH.     Clinical Summary: Pt is a 38-year-old male, who presented to the ER due to bipolar problems and needing help. Per ED notes, \"presented into the ED by EMS for a psychiatric evaluation.  Per triage note patient's parents called for patient having a psychotic break.\"    Pt stated that he has 3 previous inpatient mental health hospitalizations, however when asked where pt stated with [facility] and this admission. Pt stated that he is currently active with [facility] and he has been compliant with his medications however was unable to state the medications that he is currently prescribed. Pt denied having any history of suicidal ideation, plans, attempts or intent to end his life. Pt denied any history of self harm. Per ED SW note \"Per chart review patient has a prior suicide attempt 17 years ago by overdosing.\" Pt denied any history of trauma or abuse. Pt stated that his main stressor at this time is his depression and this has been going on for a few days. Pt denied any drug/alcohol use. Pt's UDS is positive for cannabis, SDS  swords but she removed them and pt does not know where they are. Mom stated that the pt needs to sign the paperwork for her to obtain his information, SW informed mom that the pt did sign an SUSANA for her.      Access to Weapons per Collateral Contact: [] Reports [x] Denies     After consideration of C-SSRS screening results, C-SSRS assessments, and this professional's assessment the patient's overall suicide risk assessed to be:  [] None   [x] Low   [] Moderate   [] High     [x] Discussed current suicide risk, protective and risk factors with RN and NP/Psychiatrist.    Discharge Plan:  [x] Home: possibly with mom and sister, pt is unsure, mom or sister to    [] Shelter:  [] Crisis Unit:  [] Substance Abuse Rehab:  [] Nursing Facility:  [] Other (Specify):    Follow up Provider: [facility]

## 2024-07-02 NOTE — ED NOTES
Patient currently resting with eyes closed, respirations non-labored, skin warm/dry, no distress noted. Patient responds to voice, oriented x 4 when awake. Patient calm and cooperative at present. Minimal conversation but patient denies SI or HI at present. Patient admits to intermittent auditory hallucinations but denies at present. Patient voicing no complaints at present. Patient cooperative for vitals.

## 2024-07-02 NOTE — DISCHARGE INSTRUCTIONS
Patient was offered a referral to substance abuse treatment, patient declined referral at this time.      Follow up for Tobacco Cessation at:    [facility]                                 Date:  Friday 7/12 at 10am              [address] [address]   (Inside [facility]    take B elevators to 7th floor)   Phone: [phone]   Fax: [phone]

## 2024-07-02 NOTE — PLAN OF CARE
Problem: Self Harm/Suicidality  Goal: Will have no self-injury during hospital stay  Description: INTERVENTIONS:  1.  Ensure constant observer at bedside with Q15M safety checks  2.  Maintain a safe environment  3.  Secure patient belongings  4.  Ensure family/visitors adhere to safety recommendations  5.  Ensure safety tray has been added to patient's diet order  6.  Every shift and PRN: Re-assess suicidal risk via Frequent Screener    Outcome: Progressing  Flowsheets  Taken 7/2/2024 1108 by Ronny Childress RN  Will have no self-injury during hospital stay: Ensure constant observer at bedside with Q15M safety checks  Taken 7/2/2024 0107 by Jacinto Ferguson RN  Will have no self-injury during hospital stay: (na) --     Problem: Psychosis  Goal: Will report no hallucinations or delusions  Description: INTERVENTIONS:  1. Administer medication as  ordered  2. Assist with reality testing to support increasing orientation  3. Assess if patient's hallucinations or delusions are encouraging self harm or harm to others and intervene as appropriate  Outcome: Progressing

## 2024-07-02 NOTE — BH NOTE
[facility]  Initial Interdisciplinary Treatment Plan Note      Original treatment plan Date & Time: 7-2-24   1000 am    Admission Type:  Admission Type: Involuntary    Reason for admission:   Reason for Admission: \"I don't remember\"    Estimated Length of Stay:  5-7days  Estimated Discharge Date: To be determined by physician.    PATIENT STRENGTHS:  Patient Strengths:   Patient Strengths and Limitations:Limitations: Unrealistic self-view, Difficult relationships / poor social skills, Demonstrates discomfort with /lack of social skills, Difficulty problem solving/relies on others to help solve problems, Tendency to isolate self  Addictive Behavior: Addictive Behavior  In the Past 3 Months, Have You Felt or Has Someone Told You That You Have a Problem With  : None  Medical Problems:  Past Medical History:   Diagnosis Date    ADD (attention deficit disorder)     Bipolar 1 disorder (HCC)      Status EXAM:Mental Status and Behavioral Exam  Normal: No  Level of Assistance: Independent/Self  Facial Expression: Flat  Affect: Appropriate  Level of Consciousness: Alert  Frequency of Checks: 4 times per hour, close  Mood:Normal: No  Mood: Anxious  Motor Activity:Normal: No  Motor Activity: Decreased  Eye Contact: Fair  Observed Behavior: Guarded  Sexual Misconduct History: Current - no  Preception: Crum to person, Crum to time, Crum to place, Crum to situation  Attention:Normal: No  Attention: Distractible  Thought Processes: Circumstantial  Thought Content:Normal: No  Thought Content: Poverty of content  Depression Symptoms: Feelings of worthlessness  Anxiety Symptoms: Generalized  María Symptoms: No problems reported or observed.  Hallucinations: None  Delusions: No  Delusions: Other (comment)  Memory:Normal: Yes  Memory: Poor recent  Insight and Judgment: No  Insight and Judgment: Poor judgment, Poor insight    EDUCATION:   Learner Progress Toward Treatment Goals: Will review group plans and

## 2024-07-02 NOTE — GROUP NOTE
Group Therapy Note    Date: 7/2/2024    Group Start Time: 0930  Group End Time: 0950  Group Topic: Community Meeting    [facility] ACUTE BH 2    Aisha Salvador CTRS    Group Therapy Note    Attendees: 16    Date: 7/2/2024  Start Time: 0930  End Time:  0950  Number of Participants: 16    Type of Group: Community Meeting    Was updated on expectations of the unit, staffing, and programming.  Patient shared goal for today as \"Try to be happy.\" Patient sat away from group perimeter.    Status After Intervention:  Improved    Participation Level: Active Listener and Interactive    Participation Quality: Appropriate, Attentive, and Sharing      Speech:  normal      Thought Process/Content: Logical  Linear      Affective Functioning: Congruent      Mood:  Flat      Level of consciousness:  Alert and Attentive      Response to Learning: Able to verbalize current knowledge/experience, Able to verbalize/acknowledge new learning, Able to retain information, Capable of insight, Able to change behavior, and Progressing to goal      Endings: None Reported    Modes of Intervention: Education, Support, Socialization, Exploration, Clarifying, and Problem-solving      Discipline Responsible: Psychoeducational Specialist      Signature:  CARISA Huitron
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
## 2024-07-02 NOTE — BH NOTE
Pt denies suicidal / homicidal ideations.   Pt denies hallucinations.   Pt is cooperative but is without immediate distress.  Pt has a flat affect but is quiet in nature.

## 2024-07-02 NOTE — GROUP NOTE
Group Therapy Note    Date: 7/2/2024    Group Start Time: 0950  Group End Time: 1020  Group Topic: Psychoeducation    [facility] 2    Aisha Salvador CTRS    Group Therapy Note    Attendees: 19    Date: 7/2/2024  Start Time: 0950  End Time:  1020  Number of Participants: 19    Type of Group: Psychoeducation    Name:  Stress and Wellbeing    Patient's Goal:  Identify types of stress, signs of stress and how to cope with stress.    Notes:  CTRS lead educational group discussion on stress and wellbeing. Encouraged patients to share their experiences. Patient was actively engaged in group discussion and made positive responses throughout group.    Status After Intervention:  Improved    Participation Level: Active Listener and Interactive    Participation Quality: Appropriate, Attentive, and Sharing      Speech:  normal      Thought Process/Content: Logical  Linear      Affective Functioning: Congruent      Mood:  Appropriate      Level of consciousness:  Alert and Attentive      Response to Learning: Able to verbalize current knowledge/experience, Able to verbalize/acknowledge new learning, Able to retain information, Capable of insight, Able to change behavior, and Progressing to goal      Endings: None Reported    Modes of Intervention: Education, Support, Socialization, Exploration, Clarifying, and Problem-solving      Discipline Responsible: Psychoeducational Specialist      Signature:  CARISA Huitron

## 2024-07-02 NOTE — PROGRESS NOTES
4 Eyes Skin Assessment     NAME:  Iam Cagle  YOB: 1985  MEDICAL RECORD NUMBER:  [id number]    The patient is being assessed for  Admission    I agree that at least one RN has performed a thorough Head to Toe Skin Assessment on the patient. ALL assessment sites listed below have been assessed.      Areas assessed by both nurses:    Head, Face, Ears, Shoulders, Back, Chest, Arms, Elbows, Hands, Sacrum. Buttock, Coccyx, Ischium, and Legs. Feet and Heels        Does the Patient have a Wound? No noted wound(s)       Albert Prevention initiated by RN: Yes  Wound Care Orders initiated by RN: No    Pressure Injury (Stage 3,4, Unstageable, DTI, NWPT, and Complex wounds) if present, place Wound referral order by RN under : No    New Ostomies, if present place, Ostomy referral order under : No     Nurse 1 eSignature: Electronically signed by Jacinto Ferguson RN on 7/2/24 at 4:10 AM EDT    **SHARE this note so that the co-signing nurse can place an eSignature**    Nurse 2 eSignature: Electronically signed by Marjan Lackey RN on 7/2/24 at 4:09 AM EDT

## 2024-07-02 NOTE — ED NOTES
Access center let VM regarding : notify insurance is showing  and to see if there was any additional insurance information available.      followed up with registration and is awaiting a call back.

## 2024-07-03 LAB
CHOLEST SERPL-MCNC: 123 MG/DL
HBA1C MFR BLD: 5.6 % (ref 4–5.6)
HDLC SERPL-MCNC: 37 MG/DL
LDLC SERPL CALC-MCNC: 70 MG/DL
TRIGL SERPL-MCNC: 81 MG/DL
VLDLC SERPL CALC-MCNC: 16 MG/DL

## 2024-07-03 PROCEDURE — 99232 SBSQ HOSP IP/OBS MODERATE 35: CPT | Performed by: NURSE PRACTITIONER

## 2024-07-03 PROCEDURE — 80061 LIPID PANEL: CPT

## 2024-07-03 PROCEDURE — 36415 COLL VENOUS BLD VENIPUNCTURE: CPT

## 2024-07-03 PROCEDURE — 83036 HEMOGLOBIN GLYCOSYLATED A1C: CPT

## 2024-07-03 PROCEDURE — 1240000000 HC EMOTIONAL WELLNESS R&B

## 2024-07-03 PROCEDURE — 6370000000 HC RX 637 (ALT 250 FOR IP): Performed by: NURSE PRACTITIONER

## 2024-07-03 RX ADMIN — DIVALPROEX SODIUM 250 MG: 250 TABLET, DELAYED RELEASE ORAL at 20:10

## 2024-07-03 RX ADMIN — DIVALPROEX SODIUM 250 MG: 250 TABLET, DELAYED RELEASE ORAL at 10:22

## 2024-07-03 RX ADMIN — BENZTROPINE MESYLATE 2 MG: 2 TABLET ORAL at 10:22

## 2024-07-03 RX ADMIN — OLANZAPINE 20 MG: 10 TABLET, FILM COATED ORAL at 20:11

## 2024-07-03 ASSESSMENT — PAIN SCALES - GENERAL
PAINLEVEL_OUTOF10: 0
PAINLEVEL_OUTOF10: 0

## 2024-07-03 NOTE — PLAN OF CARE
Problem: Psychosis  Goal: Will report no hallucinations or delusions  Description: INTERVENTIONS:  1. Administer medication as  ordered  2. Assist with reality testing to support increasing orientation  3. Assess if patient's hallucinations or delusions are encouraging self harm or harm to others and intervene as appropriate  7/3/2024 0017 by Alisa Mosqueda, RN  Outcome: Progressing     Problem: Behavior  Goal: Pt/Family maintain appropriate behavior and adhere to behavioral management agreement, if implemented  Description: INTERVENTIONS:  1. Assess patient/family's coping skills and  non-compliant behavior (including use of illegal substances)  2. Notify security of behavior or suspected illegal substances which indicate the need for search of the family and/or belongings  3. Encourage verbalization of thoughts and concerns in a socially appropriate manner  4. Utilize positive, consistent limit setting strategies supporting safety of patient, staff and others  5. Encourage participation in the decision making process about the behavioral management agreement  6. If a visitor's behavior poses a threat to safety call refer to organization policy.  7. Initiate consult with , Psychosocial CNS, Spiritual Care as appropriate  7/3/2024 0017 by Alisa Mosqueda RN  Outcome: Progressing     Problem: Anxiety  Goal: Will report anxiety at manageable levels  Description: INTERVENTIONS:  1. Administer medication as ordered  2. Teach and rehearse alternative coping skills  3. Provide emotional support with 1:1 interaction with staff  Outcome: Progressing  Flowsheets (Taken 7/2/2024 1108 by Ronny Childress, RN)  Will report anxiety at manageable levels: Administer medication as ordered     Patient denies suicidal/homicidal ideations. Patient endorses visual hallucinations of shadow figures at times. Patient reports some anxiety and depression, does not elaborate. Patient is friendly, cooperative, and guarded.

## 2024-07-03 NOTE — PROGRESS NOTES
Patient declined to attend the following groups:    Community Meeting  Psychoeducation   Peer Recovery    Will continue to encourage patient to attend programming.

## 2024-07-03 NOTE — GROUP NOTE
Group Therapy Note    Date: 7/3/2024    Group Start Time: 1050  Group End Time: 1130  Group Topic: Cognitive Skills    [facility] 1    Deandra Davila MSW, KRUPA        Group Therapy Note    Attendees: 16       Patient's Goal:  Pt will be able to discuss tips for self-care and identify their self-care needs.     Notes:  Pt was an active participant in group discussion.     Status After Intervention:  Improved    Participation Level: Active Listener and Interactive    Participation Quality: Appropriate, Attentive, Sharing, and Supportive      Speech:  normal      Thought Process/Content: Logical  Linear      Affective Functioning: Congruent      Mood: anxious and depressed      Level of consciousness:  Alert, Oriented x4, and Attentive      Response to Learning: Able to verbalize current knowledge/experience, Able to verbalize/acknowledge new learning, Able to retain information, Capable of insight, and Progressing to goal      Endings: None Reported    Modes of Intervention: Education, Support, Socialization, Exploration, Clarifying, and Problem-solving      Discipline Responsible: /Counselor      Signature:  ELI Landry LSW

## 2024-07-03 NOTE — PLAN OF CARE
appropriate  Outcome: Progressing     Problem: Anxiety  Goal: Will report anxiety at manageable levels  Description: INTERVENTIONS:  1. Administer medication as ordered  2. Teach and rehearse alternative coping skills  3. Provide emotional support with 1:1 interaction with staff  Outcome: Progressing     Problem: Involuntary Admit  Goal: Will cooperate with staff recommendations and doctor's orders and will demonstrate appropriate behavior  Description: INTERVENTIONS:  1. Treat underlying conditions and offer medication as ordered  2. Educate regarding involuntary admission procedures and rules  3. Contain excessive/inappropriate behavior per unit and hospital policies  Outcome: Progressing     Problem: Risk for Elopement  Goal: Patient will not exit the unit/facility without proper excort  Outcome: Progressing     Problem: Pain  Goal: Verbalizes/displays adequate comfort level or baseline comfort level  Outcome: Progressing  PT. IS QUIET, LOW PROFILE, GUARDED, PREOCCUPIED AND DELAYED ON APPROACH. PT. DID ATTEND TREATMENT TEAM, WHERE PT. DENIED ACTIVE SUICIDAL IDEATIONS, HOMICIDAL IDEATIONS AND VOICED NO DELUSIONS. PT. REPORTED VISUAL HALLUCINATIONS ARE LESS SEVERE AND LESS FREQUENT, \"I AM NOT SURE WHAT THEY ARE\" WHEN ASKED ABOUT DESCRIPTION. PT. HAS BEEN MEDICATION COMPLAINT AND ATTENDS SELECT GROUPS. NO UNIT PROBLEMS. SPEND A LOT OF TIME IN ROOM WHEN NOT IN GROUPS.

## 2024-07-03 NOTE — PLAN OF CARE
Problem: Self Harm/Suicidality  Goal: Will have no self-injury during hospital stay  Description: INTERVENTIONS:  1.  Ensure constant observer at bedside with Q15M safety checks  2.  Maintain a safe environment  3.  Secure patient belongings  4.  Ensure family/visitors adhere to safety recommendations  5.  Ensure safety tray has been added to patient's diet order  6.  Every shift and PRN: Re-assess suicidal risk via Frequent Screener    7/3/2024 1923 by Jennifer Pimentel RN  Outcome: Progressing  7/3/2024 1525 by Riri Ross RN  Outcome: Progressing     Problem: María  Goal: Will exhibit normal sleep and speech and no impulsivity  Description: INTERVENTIONS:  1. Administer medication as ordered  2. Set limits on impulsive behavior  3. Make attempts to decrease external stimuli as possible  7/3/2024 1923 by Jennifer Pimentel RN  Outcome: Progressing  7/3/2024 1525 by Riri Ross RN  Outcome: Progressing     Problem: Psychosis  Goal: Will report no hallucinations or delusions  Description: INTERVENTIONS:  1. Administer medication as  ordered  2. Assist with reality testing to support increasing orientation  3. Assess if patient's hallucinations or delusions are encouraging self harm or harm to others and intervene as appropriate  7/3/2024 1923 by Jennifer Pimentel RN  Outcome: Progressing  7/3/2024 1525 by Riri Ross RN  Outcome: Progressing     Problem: Behavior  Goal: Pt/Family maintain appropriate behavior and adhere to behavioral management agreement, if implemented  Description: INTERVENTIONS:  1. Assess patient/family's coping skills and  non-compliant behavior (including use of illegal substances)  2. Notify security of behavior or suspected illegal substances which indicate the need for search of the family and/or belongings  3. Encourage verbalization of thoughts and concerns in a socially appropriate manner  4. Utilize positive, consistent limit setting strategies supporting safety of patient, staff

## 2024-07-03 NOTE — PLAN OF CARE
[facility]  Day 3 Interdisciplinary Treatment Plan NOTE    Review Date & Time:  7/3/24 1000    Patient was in treatment team    Estimated Length of Stay Update:   5 DAYS  Estimated Discharge Date Update:  FRIDAY    EDUCATION:   Learner Progress Toward Treatment Goals: Reviewed results and recommendations of this team    Method: Small group    Outcome: Needs reinforcement    PATIENT GOALS: \"TALK TO MY MOM\"    PLAN/TREATMENT RECOMMENDATIONS UPDATE: MEDICATIONS, GROUPS, SUPPORTIVE CARE, ASSESS FOR PSYCHOSIS AND FRUSTRATION TOLERANCE, COLLATERAL INFORMATION, DISCHARGE PLANNING AND FOLLOW UP    GOALS UPDATE:   Time frame for Short-Term Goals:  5 DAYS      Riri Ross RN

## 2024-07-03 NOTE — PROGRESS NOTES
BEHAVIORAL HEALTH FOLLOW-UP NOTE     7/3/2024     Patient was seen and examined in person, Chart reviewed   Patient's case discussed with staff/team    Chief Complaint: \"I am doing okay.\"    Interim History:   Patient seen in treatment team flat blunted affect he is preoccupied.  Staff indicates that he is continued to endorse visual hallucinations as shadow figures he is guarded and isolative tends to keep to himself on the unit and attending select groups.  Minimal socialization with peers.  States he did talk to his mom yesterday denies suicide ideations intent or plan denies any side effects to medications offers little conversation and somewhat evasive and guarded      appetite: [x] Normal/Unchanged  [] Increased  [] Decreased      Sleep:       [x] Normal/Unchanged  [] Fair       [] Poor              Energy:    [x] Normal/Unchanged  [] Increased  [] Decreased        SI [] Present  [x] Absent    HI  []Present  [x] Absent     Aggression:  [] yes  [x] no    Patient is [x] able  [] unable to CONTRACT FOR SAFETY     PAST MEDICAL/PSYCHIATRIC HISTORY:   Past Medical History:   Diagnosis Date    ADD (attention deficit disorder)     Bipolar 1 disorder (HCC)        FAMILY/SOCIAL HISTORY:  No family history on file.  Social History     Socioeconomic History    Marital status: Single     Spouse name: Not on file    Number of children: Not on file    Years of education: Not on file    Highest education level: Not on file   Occupational History    Occupation:    Tobacco Use    Smoking status: Every Day     Current packs/day: 0.50     Average packs/day: 0.5 packs/day for 8.0 years (4.0 ttl pk-yrs)     Types: Cigarettes    Smokeless tobacco: Never    Tobacco comments:     Smokes 8-9 cigarettes / day   Vaping Use    Vaping Use: Never used   Substance and Sexual Activity    Alcohol use: Yes     Comment: occasionally    Drug use: No    Sexual activity: Not on file   Other Topics Concern    Not on file   Social History

## 2024-07-03 NOTE — CARE COORDINATION
Pt was seen during treatment team. Pt states that he is doing good, that his medications are working well and he has improved sleep. He states that he spoke with his mother and it went ok. He denied SI/HI. He admits to auditory hallucinations of voices that come and go. He denied questions/concerns for treatment team.     Pt plans to return home, follow up with [facility].

## 2024-07-03 NOTE — BH NOTE
Patient resting with eyes closed. Respirations even and unlabored. No signs of distress. Purposeful rounding continued.

## 2024-07-04 PROCEDURE — 1240000000 HC EMOTIONAL WELLNESS R&B

## 2024-07-04 PROCEDURE — 6370000000 HC RX 637 (ALT 250 FOR IP): Performed by: NURSE PRACTITIONER

## 2024-07-04 PROCEDURE — 99232 SBSQ HOSP IP/OBS MODERATE 35: CPT | Performed by: NURSE PRACTITIONER

## 2024-07-04 RX ADMIN — OLANZAPINE 20 MG: 10 TABLET, FILM COATED ORAL at 20:51

## 2024-07-04 RX ADMIN — DIVALPROEX SODIUM 250 MG: 250 TABLET, DELAYED RELEASE ORAL at 09:22

## 2024-07-04 RX ADMIN — DIVALPROEX SODIUM 250 MG: 250 TABLET, DELAYED RELEASE ORAL at 20:51

## 2024-07-04 RX ADMIN — BENZTROPINE MESYLATE 2 MG: 2 TABLET ORAL at 09:22

## 2024-07-04 ASSESSMENT — PAIN SCALES - GENERAL: PAINLEVEL_OUTOF10: 0

## 2024-07-04 NOTE — PLAN OF CARE
Problem: Self Harm/Suicidality  Goal: Will have no self-injury during hospital stay  Description: INTERVENTIONS:  1.  Ensure constant observer at bedside with Q15M safety checks  2.  Maintain a safe environment  3.  Secure patient belongings  4.  Ensure family/visitors adhere to safety recommendations  5.  Ensure safety tray has been added to patient's diet order  6.  Every shift and PRN: Re-assess suicidal risk via Frequent Screener    Outcome: Progressing     Problem: Psychosis  Goal: Will report no hallucinations or delusions  Description: INTERVENTIONS:  1. Administer medication as  ordered  2. Assist with reality testing to support increasing orientation  3. Assess if patient's hallucinations or delusions are encouraging self harm or harm to others and intervene as appropriate  Outcome: Progressing     Problem: Anxiety  Goal: Will report anxiety at manageable levels  Description: INTERVENTIONS:  1. Administer medication as ordered  2. Teach and rehearse alternative coping skills  3. Provide emotional support with 1:1 interaction with staff  Outcome: Progressing       Patient pleasant and cooperative. Isolative to room but attending groups.  Reports feeling \"good\" today. Denies suicidal and homicidal thoughts. Denies hallucinations.

## 2024-07-04 NOTE — GROUP NOTE
Shared goal for the day as to keep my focus and my head held high.                                                                       Group Therapy Note    Date: 7/4/2024    Group Start Time: 0930  Group End Time: 0945  Group Topic: Community Meeting    [facility]    Sara Conrad, CARISA    Type of Group: Community Meeting      Patient's Goal:  Patient will be able to id staffing assignments, expectations of patients, and general information re: floor rules. Will be prompted to share goal for the day.     Notes:  Patient appeared to be an active listener, taking in information presented and was prompted to share goal for the day.    Status After Intervention:  Improved    Participation Level: Active Listener and Interactive    Participation Quality: Appropriate, Attentive, and Sharing      Speech:  normal      Thought Process/Content: Logical      Affective Functioning: Congruent      Mood: euthymic      Level of consciousness:  Alert and Oriented x4      Response to Learning: Able to verbalize/acknowledge new learning      Endings: None Reported    Modes of Intervention: Support, Socialization, and Clarifying      Discipline Responsible: Psychoeducational Specialist      Signature:  CARISA Joe

## 2024-07-04 NOTE — GROUP NOTE
Group Therapy Note    Date: 7/4/2024    Group Start Time: 1100  Group End Time: 1150  Group Topic: Cognitive Skills    [facility]    Awa Rachel MSW, KRUPA        Group Therapy Note    Attendees:9         Patient's Goal:  Self esteem journal activity    Notes:  pt was an active participant in group activity and was able to share and relate    Status After Intervention:  Improved    Participation Level: Active Listener and Interactive    Participation Quality: Appropriate, Attentive, Sharing, and Supportive      Speech:  normal      Thought Process/Content: Logical      Affective Functioning: Congruent      Mood: euthymic      Level of consciousness:  Alert, Oriented x4, and Attentive      Response to Learning: Able to verbalize current knowledge/experience and Able to retain information      Endings: None Reported    Modes of Intervention: Education, Support, Socialization, Exploration, Clarifying, Problem-solving, and Activity      Discipline Responsible: /Counselor      Signature:  ELI Mendoza LSW

## 2024-07-04 NOTE — PROGRESS NOTES
BEHAVIORAL HEALTH FOLLOW-UP NOTE     7/4/2024     Patient was seen and examined in person, Chart reviewed   Patient's case discussed with staff/team    Chief Complaint: \"I am doing okay.\"    Interim History:   I saw patient today in his room flat blunted affect offers little conversation tells me that he is doing \"okay today.\"  Still reports seeing \"visions.\"  Denies auditory hallucinations eating well sleeping well and no neurovegetative signs of depression he tells me his mom is going to be visiting today he denies any side effects with the medications  appetite: [x] Normal/Unchanged  [] Increased  [] Decreased      Sleep:       [x] Normal/Unchanged  [] Fair       [] Poor              Energy:    [x] Normal/Unchanged  [] Increased  [] Decreased        SI [] Present  [x] Absent    HI  []Present  [x] Absent     Aggression:  [] yes  [x] no    Patient is [x] able  [] unable to CONTRACT FOR SAFETY     PAST MEDICAL/PSYCHIATRIC HISTORY:   Past Medical History:   Diagnosis Date    ADD (attention deficit disorder)     Bipolar 1 disorder (HCC)        FAMILY/SOCIAL HISTORY:  No family history on file.  Social History     Socioeconomic History    Marital status: Single     Spouse name: Not on file    Number of children: Not on file    Years of education: Not on file    Highest education level: Not on file   Occupational History    Occupation:    Tobacco Use    Smoking status: Every Day     Current packs/day: 0.50     Average packs/day: 0.5 packs/day for 8.0 years (4.0 ttl pk-yrs)     Types: Cigarettes    Smokeless tobacco: Never    Tobacco comments:     Smokes 8-9 cigarettes / day   Vaping Use    Vaping Use: Never used   Substance and Sexual Activity    Alcohol use: Yes     Comment: occasionally    Drug use: No    Sexual activity: Not on file   Other Topics Concern    Not on file   Social History Narrative    Not on file     Social Determinants of Health     Financial Resource Strain: Medium Risk (2/8/2023)    Overall  (HALDOL) tablet 5 mg, 5 mg, Oral, Q6H PRN **OR** haloperidol lactate (HALDOL) injection 5 mg, 5 mg, IntraMUSCular, Q6H PRN, Dellick, Kori B, APRN - CNP    melatonin tablet 3 mg, 3 mg, Oral, Nightly PRN, Dellick, Kori B, APRN - CNP    LORazepam (ATIVAN) injection 2 mg, 2 mg, IntraMUSCular, Q6H PRN, Dellick, Kori B, APRN - CNP    diphenhydrAMINE (BENADRYL) injection 50 mg, 50 mg, IntraMUSCular, Q6H PRN, Dellick, Kori B, APRN - CNP    nicotine polacrilex (COMMIT) lozenge 2 mg, 2 mg, Oral, Q2H PRN, Pam Guzmán MD    benztropine (COGENTIN) tablet 2 mg, 2 mg, Oral, Daily, Dellick, Kori B, APRN - CNP, 2 mg at 07/04/24 0922    OLANZapine (ZYPREXA) tablet 20 mg, 20 mg, Oral, Nightly, Dellick, Kori B, APRN - CNP, 20 mg at 07/03/24 2011    divalproex (DEPAKOTE) DR tablet 250 mg, 250 mg, Oral, 2 times per day, Dellick, Kori B, APRN - CNP, 250 mg at 07/04/24 0922    potassium chloride (KLOR-CON M) extended release tablet 40 mEq, 40 mEq, Oral, Once, Handy Verduzco,       Examination:  BP (!) 110/55   Pulse 79   Temp 98.7 °F (37.1 °C) (Oral)   Resp 14   Ht 1.88 m (6' 2\")   Wt 79.4 kg (175 lb)   SpO2 99%   BMI 22.47 kg/m²   Gait - steady  Medication side effects(SE):     Mental Status Examination:    Level of consciousness:  within normal limits   Appearance:  fair grooming and fair hygiene  Behavior/Motor:  no abnormalities noted  Attitude toward examiner:  cooperative  Speech:  spontaneous, normal rate and normal volume   Mood: \" I am okay.\"  Affect: Mood incongruent flat and blunted  Thought processes: Poverty of thought  Thought content: Appears preoccupied guarded if these sedatives staff reports visual hallucinations denies suicidal or homicidal ideations intent or plan     language: able to name objects and repeate phrases  Remote Memory: intact  Recent Memory: intact  Cognition:  oriented to person, place, and time   Fund of Knowledge: Vocabulary intact, pt is aware of current events and past

## 2024-07-04 NOTE — GROUP NOTE
Group Therapy Note    Date: 7/4/2024    Group Start Time: 0945  Group End Time: 1035  Group Topic: Psychoeducation    [facility]    Sara Conrad, CTRS    Date: 7/4/2024  Module Name:  Coping skills for daily stress management   Patient's Goal:  pt will be able to pick 1/2 skills to manage daily stress levels to prevent crisis.     Notes:  pleasant and sharing in group, accepting of handout and willing to share in group discussion.     Status After Intervention:  Improved    Participation Level: Active Listener and Interactive    Participation Quality: Appropriate, Attentive, and Sharing      Speech:  normal      Thought Process/Content: Logical      Affective Functioning: Congruent      Mood: euthymic      Level of consciousness:  Alert, Oriented x4, and Attentive      Response to Learning: Able to verbalize/acknowledge new learning, Able to retain information, and Progressing to goal      Endings: None Reported    Modes of Intervention: Education, Support, Socialization, and Clarifying      Discipline Responsible: Psychoeducational Specialist      Signature:  Sara Conrad CTRS

## 2024-07-05 LAB
DATE LAST DOSE: ABNORMAL
TME LAST DOSE: ABNORMAL H
VALPROATE SERPL-MCNC: 49 UG/ML (ref 50–100)
VANCOMYCIN DOSE: ABNORMAL MG

## 2024-07-05 PROCEDURE — 36415 COLL VENOUS BLD VENIPUNCTURE: CPT

## 2024-07-05 PROCEDURE — 80164 ASSAY DIPROPYLACETIC ACD TOT: CPT

## 2024-07-05 PROCEDURE — 6370000000 HC RX 637 (ALT 250 FOR IP): Performed by: NURSE PRACTITIONER

## 2024-07-05 PROCEDURE — 1240000000 HC EMOTIONAL WELLNESS R&B

## 2024-07-05 PROCEDURE — 99232 SBSQ HOSP IP/OBS MODERATE 35: CPT | Performed by: NURSE PRACTITIONER

## 2024-07-05 RX ADMIN — DIVALPROEX SODIUM 250 MG: 250 TABLET, DELAYED RELEASE ORAL at 20:47

## 2024-07-05 RX ADMIN — DIVALPROEX SODIUM 250 MG: 250 TABLET, DELAYED RELEASE ORAL at 10:22

## 2024-07-05 RX ADMIN — BENZTROPINE MESYLATE 2 MG: 2 TABLET ORAL at 10:22

## 2024-07-05 RX ADMIN — OLANZAPINE 20 MG: 10 TABLET, FILM COATED ORAL at 20:47

## 2024-07-05 NOTE — PLAN OF CARE
Problem: Self Harm/Suicidality  Goal: Will have no self-injury during hospital stay  Description: INTERVENTIONS:  1.  Ensure constant observer at bedside with Q15M safety checks  2.  Maintain a safe environment  3.  Secure patient belongings  4.  Ensure family/visitors adhere to safety recommendations  5.  Ensure safety tray has been added to patient's diet order  6.  Every shift and PRN: Re-assess suicidal risk via Frequent Screener    Outcome: Progressing     Problem: María  Goal: Will exhibit normal sleep and speech and no impulsivity  Description: INTERVENTIONS:  1. Administer medication as ordered  2. Set limits on impulsive behavior  3. Make attempts to decrease external stimuli as possible  Outcome: Progressing     Problem: Psychosis  Goal: Will report no hallucinations or delusions  Description: INTERVENTIONS:  1. Administer medication as  ordered  2. Assist with reality testing to support increasing orientation  3. Assess if patient's hallucinations or delusions are encouraging self harm or harm to others and intervene as appropriate  Outcome: Progressing     Problem: Anxiety  Goal: Will report anxiety at manageable levels  Description: INTERVENTIONS:  1. Administer medication as ordered  2. Teach and rehearse alternative coping skills  3. Provide emotional support with 1:1 interaction with staff  Outcome: Progressing    Pt denies SI, HI and AVH. Pt out on the unit. Fair eye contact. Quiet. Low profile. Pt soft spoken. Pt stated he was feeling better. Medication compliant. Attends groups. Presents flat and depressed. Appetite appropriate. Will continue to monitor.

## 2024-07-05 NOTE — GROUP NOTE
Group Therapy Note    Date: 7/5/2024    Group Start Time: 0930  Group End Time: 0945  Group Topic: Community Meeting    [facility]    Aisha Salvador CTRS    Group Therapy Note    Attendees: 16    Date: 7/5/2024  Start Time: 0930  End Time:  0945  Number of Participants: 16    Type of Group: Community Meeting    Was updated on expectations of the unit, staffing, and programming.  Patient shared goal for today as \"Stay healthy, try to communicate in a positive note.\"    Status After Intervention:  Improved    Participation Level: Active Listener and Interactive    Participation Quality: Appropriate, Attentive, and Sharing      Speech:  normal      Thought Process/Content: Logical  Linear      Affective Functioning: Blunted      Mood:  Flat      Level of consciousness:  Alert and Attentive      Response to Learning: Able to verbalize current knowledge/experience, Able to verbalize/acknowledge new learning, Able to retain information, Capable of insight, Able to change behavior, and Progressing to goal      Endings: None Reported    Modes of Intervention: Education, Support, Socialization, Exploration, Clarifying, and Problem-solving      Discipline Responsible: Psychoeducational Specialist      Signature:  CARISA Huitron

## 2024-07-05 NOTE — GROUP NOTE
Group Therapy Note    Date: 7/5/2024    Group Start Time: 0945  Group End Time: 1020  Group Topic: Psychoeducation    [facility]    Aisha Salvador CTRS    Group Therapy Note    Attendees: 13    Date: 7/5/2024  Start Time: 0945  End Time:  1020  Number of Participants: 13    Type of Group: Psychoeducation    Name:  Boundaries    Patient's Goal:  Identify types of boundaries and how to set boundaries.    Notes:  CTRS lead educational group discussion on boundaries. Encouraged patients to share their experiences. Patient did not add to group discussion and left group before group concluded.    Status After Intervention:  Unchanged    Participation Level: Minimal    Participation Quality: Resistant      Speech:  None      Thought Process/Content: None observed      Affective Functioning: Blunted      Mood:  Flat      Level of consciousness:  Preoccupied and Inattentive      Response to Learning: Resistant      Endings: None Reported    Modes of Intervention: Education, Support, Socialization, Exploration, Clarifying, and Problem-solving      Discipline Responsible: Psychoeducational Specialist      Signature:  CARISA Huitron

## 2024-07-05 NOTE — GROUP NOTE
Group Therapy Note    Date: 7/5/2024    Group Start Time: 1055  Group End Time: 1130  Group Topic: Cognitive Skills    [facility]    Deandra Davila MSW, LSW        Group Therapy Note    Attendees: 14       Patient's Goal:  Pt will be able to participate in group discussion on self-management.     Notes:  Pt was an active participant in group discussion.     Status After Intervention:  Improved    Participation Level: Active Listener and Minimal    Participation Quality: Appropriate and Attentive      Speech:  normal      Thought Process/Content: Logical  Linear      Affective Functioning: Congruent      Mood: anxious      Level of consciousness:  Alert, Oriented x4, and Attentive      Response to Learning: Able to verbalize current knowledge/experience and Able to verbalize/acknowledge new learning      Endings: None Reported    Modes of Intervention: Education, Support, Socialization, Exploration, Clarifying, and Problem-solving      Discipline Responsible: /Counselor      Signature:  ELI Landry LSW

## 2024-07-05 NOTE — CARE COORDINATION
RACHAEL contacted pt mother Rianna [phone] (SUSANA signed) to gain updated collateral. She states that pt siblings have visited, she did not visit. She states that they see improvement, but feel he needs more time admitted. She does not feel he is 100% himself yet. She states that \"another week or two will do\". RACHAEL discussed with her that treatment team sees improvement in pt and our average length of stay on the unit. RACHAEL informed her that they will evaluate pt daily, but that he may discharge in a few days based off of provider's evaluation. She verbalized understanding and is agreeable to this. She would like to be kept updated on pt discharge date.     RACHAEL contacted Santana  and pt has appointments 7/9 at 12:30pm crisis support with Kori and 7/30 at 3pm for medications with félix.

## 2024-07-05 NOTE — PLAN OF CARE
Problem: Self Harm/Suicidality  Goal: Will have no self-injury during hospital stay  Description: INTERVENTIONS:  1.  Ensure constant observer at bedside with Q15M safety checks  2.  Maintain a safe environment  3.  Secure patient belongings  4.  Ensure family/visitors adhere to safety recommendations  5.  Ensure safety tray has been added to patient's diet order  6.  Every shift and PRN: Re-assess suicidal risk via Frequent Screener  7/4/2024 2231 by Mikki Perez RN  Outcome: Progressing     Problem: Behavior  Goal: Pt/Family maintain appropriate behavior and adhere to behavioral management agreement, if implemented  Description: INTERVENTIONS:  1. Assess patient/family's coping skills and  non-compliant behavior (including use of illegal substances)  2. Notify security of behavior or suspected illegal substances which indicate the need for search of the family and/or belongings  3. Encourage verbalization of thoughts and concerns in a socially appropriate manner  4. Utilize positive, consistent limit setting strategies supporting safety of patient, staff and others  5. Encourage participation in the decision making process about the behavioral management agreement  6. If a visitor's behavior poses a threat to safety call refer to organization policy.  7. Initiate consult with , Psychosocial CNS, Spiritual Care as appropriate  Outcome: Progressing     Problem: Anxiety  Goal: Will report anxiety at manageable levels  Description: INTERVENTIONS:  1. Administer medication as ordered  2. Teach and rehearse alternative coping skills  3. Provide emotional support with 1:1 interaction with staff  7/4/2024 2231 by Mikki Perez RN  Outcome: Progressing     Problem: Involuntary Admit  Goal: Will cooperate with staff recommendations and doctor's orders and will demonstrate appropriate behavior  Description: INTERVENTIONS:  1. Treat underlying conditions and offer medication as ordered  2. Educate

## 2024-07-05 NOTE — PROGRESS NOTES
BEHAVIORAL HEALTH FOLLOW-UP NOTE     7/5/2024     Patient was seen and examined in person, Chart reviewed   Patient's case discussed with staff/team    Chief Complaint: \"I am doing okay.\"    Interim History:   Patient seen this morning sitting out the milieu.  Still saying that he is seeing \"visions.\"  His affect is brighter he is able to smile he is out of his room and sitting in the milieu.  He has been attending groups today.  Patient's mother visited yesterday states that patient is improving but still not at his baseline.  No behavioral disturbances noted          appetite: [x] Normal/Unchanged  [] Increased  [] Decreased      Sleep:       [x] Normal/Unchanged  [] Fair       [] Poor              Energy:    [x] Normal/Unchanged  [] Increased  [] Decreased        SI [] Present  [x] Absent    HI  []Present  [x] Absent     Aggression:  [] yes  [x] no    Patient is [x] able  [] unable to CONTRACT FOR SAFETY     PAST MEDICAL/PSYCHIATRIC HISTORY:   Past Medical History:   Diagnosis Date    ADD (attention deficit disorder)     Bipolar 1 disorder (HCC)        FAMILY/SOCIAL HISTORY:  No family history on file.  Social History     Socioeconomic History    Marital status: Single     Spouse name: Not on file    Number of children: Not on file    Years of education: Not on file    Highest education level: Not on file   Occupational History    Occupation:    Tobacco Use    Smoking status: Every Day     Current packs/day: 0.50     Average packs/day: 0.5 packs/day for 8.0 years (4.0 ttl pk-yrs)     Types: Cigarettes    Smokeless tobacco: Never    Tobacco comments:     Smokes 8-9 cigarettes / day   Vaping Use    Vaping Use: Never used   Substance and Sexual Activity    Alcohol use: Yes     Comment: occasionally    Drug use: No    Sexual activity: Not on file   Other Topics Concern    Not on file   Social History Narrative    Not on file     Social Determinants of Health     Financial Resource Strain: Medium Risk

## 2024-07-05 NOTE — PROGRESS NOTES
Spiritual Support Group Note    Number of Participants in Group: 7                       Time:3pm     Goal: Relief from isolation and loneliness             Maria C Sharing             Self-understanding and gain insight              Acceptance and belonging            Recognize they are not alone                Socialization             Empowerment       Encouragement    Topic:  [x] Spiritual Wellness and Self Care                  [] Hope                     [] Connecting with Divine/Others        [] Thankfulness and Gratitude               []  Meaningfulness and Purpose               [] Forgiveness               [] Peace               [] Connect to Community      [] Other    Participation Level:   [] Active Listener   [x] Minimal   [] Monopolizing   [] Interactive   [] No Participation   []  Other:     Attention:   [] Alert   [] Distractible   [x] Drowsy   [] Poor   [] Other:    Manner:   [] Cooperative   [] Suspicious   [x] Withdrawn   [] Guarded   [] Irritable   [] Inhospitable   [] Other:     Others Comments from Group:

## 2024-07-06 PROCEDURE — 99232 SBSQ HOSP IP/OBS MODERATE 35: CPT | Performed by: NURSE PRACTITIONER

## 2024-07-06 PROCEDURE — 1240000000 HC EMOTIONAL WELLNESS R&B

## 2024-07-06 PROCEDURE — 6370000000 HC RX 637 (ALT 250 FOR IP): Performed by: NURSE PRACTITIONER

## 2024-07-06 RX ADMIN — DIVALPROEX SODIUM 250 MG: 250 TABLET, DELAYED RELEASE ORAL at 08:51

## 2024-07-06 RX ADMIN — DIVALPROEX SODIUM 250 MG: 250 TABLET, DELAYED RELEASE ORAL at 20:58

## 2024-07-06 RX ADMIN — Medication 3 MG: at 20:58

## 2024-07-06 RX ADMIN — BENZTROPINE MESYLATE 2 MG: 2 TABLET ORAL at 08:51

## 2024-07-06 RX ADMIN — OLANZAPINE 20 MG: 10 TABLET, FILM COATED ORAL at 20:58

## 2024-07-06 ASSESSMENT — PAIN SCALES - GENERAL
PAINLEVEL_OUTOF10: 0
PAINLEVEL_OUTOF10: 0

## 2024-07-06 NOTE — PLAN OF CARE
Problem: Self Harm/Suicidality  Goal: Will have no self-injury during hospital stay  Description: INTERVENTIONS:  1.  Ensure constant observer at bedside with Q15M safety checks  2.  Maintain a safe environment  3.  Secure patient belongings  4.  Ensure family/visitors adhere to safety recommendations  5.  Ensure safety tray has been added to patient's diet order  6.  Every shift and PRN: Re-assess suicidal risk via Frequent Screener    Outcome: Progressing     Problem: María  Goal: Will exhibit normal sleep and speech and no impulsivity  Description: INTERVENTIONS:  1. Administer medication as ordered  2. Set limits on impulsive behavior  3. Make attempts to decrease external stimuli as possible  Outcome: Progressing     Problem: Psychosis  Goal: Will report no hallucinations or delusions  Description: INTERVENTIONS:  1. Administer medication as  ordered  2. Assist with reality testing to support increasing orientation  3. Assess if patient's hallucinations or delusions are encouraging self harm or harm to others and intervene as appropriate  7/6/2024 1055 by Maite Freeman, RN  Outcome: Progressing  7/6/2024 0045 by Jennifer Pimentel, RN  Outcome: Progressing     Problem: Behavior  Goal: Pt/Family maintain appropriate behavior and adhere to behavioral management agreement, if implemented  Description: INTERVENTIONS:  1. Assess patient/family's coping skills and  non-compliant behavior (including use of illegal substances)  2. Notify security of behavior or suspected illegal substances which indicate the need for search of the family and/or belongings  3. Encourage verbalization of thoughts and concerns in a socially appropriate manner  4. Utilize positive, consistent limit setting strategies supporting safety of patient, staff and others  5. Encourage participation in the decision making process about the behavioral management agreement  6. If a visitor's behavior poses a threat to safety call refer to  organization policy.  7. Initiate consult with , Psychosocial CNS, Spiritual Care as appropriate  7/6/2024 1055 by Maite Freeman RN  Outcome: Progressing  7/6/2024 0045 by Jennifer Pimentel RN  Outcome: Progressing     Problem: Anxiety  Goal: Will report anxiety at manageable levels  Description: INTERVENTIONS:  1. Administer medication as ordered  2. Teach and rehearse alternative coping skills  3. Provide emotional support with 1:1 interaction with staff  7/6/2024 1055 by Maite Freeman RN  Outcome: Progressing  7/6/2024 0045 by Jennifer Pimentel RN  Outcome: Progressing     Problem: Sleep Disturbance  Goal: Will exhibit normal sleeping pattern  Description: INTERVENTIONS:  1. Administer medication as ordered  2. Decrease environmental stimuli, including noise, as appropriate  3. Discourage social isolation and naps during the day  Outcome: Progressing     Problem: Involuntary Admit  Goal: Will cooperate with staff recommendations and doctor's orders and will demonstrate appropriate behavior  Description: INTERVENTIONS:  1. Treat underlying conditions and offer medication as ordered  2. Educate regarding involuntary admission procedures and rules  3. Contain excessive/inappropriate behavior per unit and hospital policies  Outcome: Progressing     Problem: Risk for Elopement  Goal: Patient will not exit the unit/facility without proper excort  Outcome: Progressing     Problem: Pain  Goal: Verbalizes/displays adequate comfort level or baseline comfort level  Outcome: Progressing

## 2024-07-06 NOTE — PLAN OF CARE
Problem: Self Harm/Suicidality  Goal: Will have no self-injury during hospital stay  Description: INTERVENTIONS:  1.  Ensure constant observer at bedside with Q15M safety checks  2.  Maintain a safe environment  3.  Secure patient belongings  4.  Ensure family/visitors adhere to safety recommendations  5.  Ensure safety tray has been added to patient's diet order  6.  Every shift and PRN: Re-assess suicidal risk via Frequent Screener    7/5/2024 1634 by Sandra Ibanez RN  Outcome: Progressing     Problem: María  Goal: Will exhibit normal sleep and speech and no impulsivity  Description: INTERVENTIONS:  1. Administer medication as ordered  2. Set limits on impulsive behavior  3. Make attempts to decrease external stimuli as possible  7/5/2024 1634 by Sandra Ibanez RN  Outcome: Progressing     Problem: Psychosis  Goal: Will report no hallucinations or delusions  Description: INTERVENTIONS:  1. Administer medication as  ordered  2. Assist with reality testing to support increasing orientation  3. Assess if patient's hallucinations or delusions are encouraging self harm or harm to others and intervene as appropriate  7/6/2024 0045 by Jennifer Pimentel RN  Outcome: Progressing  7/5/2024 1634 by Sandra Ibanez RN  Outcome: Progressing     Problem: Behavior  Goal: Pt/Family maintain appropriate behavior and adhere to behavioral management agreement, if implemented  Description: INTERVENTIONS:  1. Assess patient/family's coping skills and  non-compliant behavior (including use of illegal substances)  2. Notify security of behavior or suspected illegal substances which indicate the need for search of the family and/or belongings  3. Encourage verbalization of thoughts and concerns in a socially appropriate manner  4. Utilize positive, consistent limit setting strategies supporting safety of patient, staff and others  5. Encourage participation in the decision making process about the behavioral management

## 2024-07-06 NOTE — GROUP NOTE
Group Therapy Note    Date: 7/6/2024    Group Start Time: 0945  Group End Time: 1020  Group Topic: Psychoeducation    [facility]    Aisha Salvador CTRS    Group Therapy Note    Attendees: 17    Date: 7/6/2024  Start Time: 0945  End Time:  1020  Number of Participants: 17    Type of Group: Psychoeducation    Name:  Sleep Hygiene    Patient's Goal:  Identify how sleep affects mental health and ways to improve sleep.    Notes:  CTRS lead educational group discussion on sleep hygiene. Encouraged patients to share their experiences. Patient did not add to group discussion. Patient sat away from peers during group. Patient listened quietly to group.    Status After Intervention:  Improved    Participation Level: Active Listener    Participation Quality: Appropriate, Attentive      Speech:  None      Thought Process/Content: None observed      Affective Functioning: Congruent      Mood:  Flat      Level of consciousness:  Alert and Attentive      Response to Learning: Able to verbalize current knowledge/experience, Able to verbalize/acknowledge new learning, Able to retain information, Capable of insight, Able to change behavior, and Progressing to goal      Endings: None Reported    Modes of Intervention: Education, Support, Socialization, Exploration, Clarifying, and Problem-solving      Discipline Responsible: Psychoeducational Specialist      Signature:  CARISA Huitron

## 2024-07-06 NOTE — GROUP NOTE
Group Therapy Note    Date: 7/6/2024    Group Start Time: 0930  Group End Time: 0945  Group Topic: Community Meeting    [facility]    Aisha Salvador CTRS    Group Therapy Note    Attendees: 18    Date: 7/6/2024  Start Time: 0930  End Time:  0945  Number of Participants: 18    Type of Group: Community Meeting    Was updated on expectations of the unit, staffing, and programming.  Patient shared goal for today as \"Being alert.\"    Status After Intervention:  Improved    Participation Level: Active Listener and Interactive    Participation Quality: Appropriate, Attentive, and Sharing      Speech:  normal      Thought Process/Content: Logical  Linear      Affective Functioning: Congruent      Mood:  Appropriate      Level of consciousness:  Alert and Attentive      Response to Learning: Able to verbalize current knowledge/experience, Able to verbalize/acknowledge new learning, Able to retain information, Capable of insight, Able to change behavior, and Progressing to goal      Endings: None Reported    Modes of Intervention: Education, Support, Socialization, Exploration, Clarifying, and Problem-solving      Discipline Responsible: Psychoeducational Specialist      Signature:  CARISA Huitron

## 2024-07-06 NOTE — PLAN OF CARE
Problem: Self Harm/Suicidality  Goal: Will have no self-injury during hospital stay  Description: INTERVENTIONS:  1.  Ensure constant observer at bedside with Q15M safety checks  2.  Maintain a safe environment  3.  Secure patient belongings  4.  Ensure family/visitors adhere to safety recommendations  5.  Ensure safety tray has been added to patient's diet order  6.  Every shift and PRN: Re-assess suicidal risk via Frequent Screener    7/6/2024 1935 by Jennifer Pimentel RN  Outcome: Progressing  7/6/2024 1055 by Maite Freeman RN  Outcome: Progressing     Problem: María  Goal: Will exhibit normal sleep and speech and no impulsivity  Description: INTERVENTIONS:  1. Administer medication as ordered  2. Set limits on impulsive behavior  3. Make attempts to decrease external stimuli as possible  7/6/2024 1935 by Jennifer Pimentel RN  Outcome: Progressing  7/6/2024 1055 by Maite Freeman RN  Outcome: Progressing     Problem: Psychosis  Goal: Will report no hallucinations or delusions  Description: INTERVENTIONS:  1. Administer medication as  ordered  2. Assist with reality testing to support increasing orientation  3. Assess if patient's hallucinations or delusions are encouraging self harm or harm to others and intervene as appropriate  7/6/2024 1935 by Jennifer Pimentel RN  Outcome: Progressing  7/6/2024 1055 by Maite Freeman RN  Outcome: Progressing     Problem: Behavior  Goal: Pt/Family maintain appropriate behavior and adhere to behavioral management agreement, if implemented  Description: INTERVENTIONS:  1. Assess patient/family's coping skills and  non-compliant behavior (including use of illegal substances)  2. Notify security of behavior or suspected illegal substances which indicate the need for search of the family and/or belongings  3. Encourage verbalization of thoughts and concerns in a socially appropriate manner  4. Utilize positive, consistent limit setting strategies supporting safety of

## 2024-07-06 NOTE — PROGRESS NOTES
BEHAVIORAL HEALTH FOLLOW-UP NOTE     7/6/2024     Patient was seen and examined in person, Chart reviewed   Patient's case discussed with staff/team    Chief Complaint: \"I am doing okay.\"    Interim History:   I saw patient this morning in his room.  He is resting but does answer my questions.  He denies suicidal or homicidal ideations intent or plan, he denies auditory or visual hallucination he has been attending groups his eye contact is improving.  He tends to keep to himself on the unit    appetite: [x] Normal/Unchanged  [] Increased  [] Decreased      Sleep:       [x] Normal/Unchanged  [] Fair       [] Poor              Energy:    [x] Normal/Unchanged  [] Increased  [] Decreased        SI [] Present  [x] Absent    HI  []Present  [x] Absent     Aggression:  [] yes  [x] no    Patient is [x] able  [] unable to CONTRACT FOR SAFETY     PAST MEDICAL/PSYCHIATRIC HISTORY:   Past Medical History:   Diagnosis Date    ADD (attention deficit disorder)     Bipolar 1 disorder (HCC)        FAMILY/SOCIAL HISTORY:  No family history on file.  Social History     Socioeconomic History    Marital status: Single     Spouse name: Not on file    Number of children: Not on file    Years of education: Not on file    Highest education level: Not on file   Occupational History    Occupation:    Tobacco Use    Smoking status: Every Day     Current packs/day: 0.50     Average packs/day: 0.5 packs/day for 8.0 years (4.0 ttl pk-yrs)     Types: Cigarettes    Smokeless tobacco: Never    Tobacco comments:     Smokes 8-9 cigarettes / day   Vaping Use    Vaping Use: Never used   Substance and Sexual Activity    Alcohol use: Yes     Comment: occasionally    Drug use: No    Sexual activity: Not on file   Other Topics Concern    Not on file   Social History Narrative    Not on file     Social Determinants of Health     Financial Resource Strain: Medium Risk (2/8/2023)    Overall Financial Resource Strain (CARDIA)     Difficulty of Paying  Limited  Judgement Limited    ASSESSMENT: Patient symptoms are:  [] Well controlled  [] Improving  [] Worsening  [x] No change      Diagnosis:  Principal Problem:    Acute exacerbation of chronic paranoid schizophrenia (HCC)  Active Problems:    Cannabis abuse  Resolved Problems:    Acute psychosis (HCC)      LABS:    No results for input(s): \"WBC\", \"HGB\", \"PLT\" in the last 72 hours.    No results for input(s): \"NA\", \"K\", \"CL\", \"CO2\", \"BUN\", \"CREATININE\", \"GLUCOSE\" in the last 72 hours.    No results for input(s): \"BILITOT\", \"ALKPHOS\", \"AST\", \"ALT\" in the last 72 hours.    Lab Results   Component Value Date/Time    BARBSCNU NEGATIVE 07/01/2024 03:30 AM    LABBENZ NEGATIVE 07/01/2024 03:30 AM    LABMETH NEGATIVE 07/01/2024 03:30 AM    ETOH <10 07/01/2024 03:30 AM     Lab Results   Component Value Date/Time    TSH 4.010 06/05/2016 01:03 PM     No results found for: \"LITHIUM\"  Lab Results   Component Value Date    VALPROATE 49 (L) 07/05/2024           Treatment Plan:  Reviewed current Medications with the patient.   Risks, benefits, side effects, drug-to-drug interactions and alternatives to treatment were discussed.  Collateral information:   CD evaluation  Encourage patient to attend group and other milieu activities.  Discharge planning discussed with the patient and treatment team.    Increase Depakote to 250 mg twice daily  Zyprexa 20 mg at bedtime  Cogentin 2 mg daily    PSYCHOTHERAPY/COUNSELING:  [x] Therapeutic interview  [x] Supportive  [] CBT  [] Ongoing  [] Other    [x] Patient continues to need, on a daily basis, active treatment furnished directly by or requiring the supervision of inpatient psychiatric personnel      Anticipated Length of stay:            Electronically signed by OC Gonzalez CNP on 7/6/2024 at 8:22 AM

## 2024-07-06 NOTE — GROUP NOTE
Group Therapy Note    Date: 7/6/2024    Group Start Time: 1045  Group End Time: 1130  Group Topic: Cognitive Skills    [facility]    Miranda Conti MSW, LSW        Group Therapy Note    Attendees: 14       Patient's Goal:  Pt will be able to identify their symptoms of anxiety, the cause of it and how to help manage it.     Notes:  pt participated in group and made connections.     Status After Intervention:  Improved    Participation Level: Active Listener and Interactive    Participation Quality: Appropriate, Attentive, Sharing, and Supportive      Speech:  normal      Thought Process/Content: Logical  Linear      Affective Functioning: Congruent      Mood: anxious      Level of consciousness:  Alert, Oriented x4, and Attentive      Response to Learning: Able to verbalize current knowledge/experience, Able to verbalize/acknowledge new learning, Able to retain information, and Capable of insight      Endings: None Reported    Modes of Intervention: Education, Support, Socialization, Exploration, Clarifying, and Problem-solving      Discipline Responsible: /Counselor      Signature:  ELI Reyez LSW

## 2024-07-07 PROCEDURE — 6370000000 HC RX 637 (ALT 250 FOR IP): Performed by: NURSE PRACTITIONER

## 2024-07-07 PROCEDURE — 99232 SBSQ HOSP IP/OBS MODERATE 35: CPT | Performed by: NURSE PRACTITIONER

## 2024-07-07 PROCEDURE — 1240000000 HC EMOTIONAL WELLNESS R&B

## 2024-07-07 RX ADMIN — BENZTROPINE MESYLATE 2 MG: 2 TABLET ORAL at 08:49

## 2024-07-07 RX ADMIN — OLANZAPINE 20 MG: 10 TABLET, FILM COATED ORAL at 20:31

## 2024-07-07 RX ADMIN — DIVALPROEX SODIUM 250 MG: 250 TABLET, DELAYED RELEASE ORAL at 20:31

## 2024-07-07 RX ADMIN — DIVALPROEX SODIUM 250 MG: 250 TABLET, DELAYED RELEASE ORAL at 08:49

## 2024-07-07 NOTE — PROGRESS NOTES
Patient is compliant with medications, was encouraged to attend group therapy. Patient denies thoughts to harm self or others, denies hallucinations. Patient is social with peers and staff. Patient appetite is good, behavior in control

## 2024-07-07 NOTE — PLAN OF CARE
Problem: Self Harm/Suicidality  Goal: Will have no self-injury during hospital stay  Description: INTERVENTIONS:  1.  Ensure constant observer at bedside with Q15M safety checks  2.  Maintain a safe environment  3.  Secure patient belongings  4.  Ensure family/visitors adhere to safety recommendations  5.  Ensure safety tray has been added to patient's diet order  6.  Every shift and PRN: Re-assess suicidal risk via Frequent Screener    7/7/2024 0830 by Maite Freeman RN  Outcome: Progressing  7/6/2024 1935 by Jennifer Pimentel RN  Outcome: Progressing     Problem: María  Goal: Will exhibit normal sleep and speech and no impulsivity  Description: INTERVENTIONS:  1. Administer medication as ordered  2. Set limits on impulsive behavior  3. Make attempts to decrease external stimuli as possible  7/7/2024 0830 by Maite Freeman RN  Outcome: Progressing  7/6/2024 1935 by Jennifer Pimentel RN  Outcome: Progressing     Problem: Psychosis  Goal: Will report no hallucinations or delusions  Description: INTERVENTIONS:  1. Administer medication as  ordered  2. Assist with reality testing to support increasing orientation  3. Assess if patient's hallucinations or delusions are encouraging self harm or harm to others and intervene as appropriate  7/7/2024 0830 by Maite Freeman RN  Outcome: Progressing  7/6/2024 1935 by Jennifer Pimentel RN  Outcome: Progressing     Problem: Behavior  Goal: Pt/Family maintain appropriate behavior and adhere to behavioral management agreement, if implemented  Description: INTERVENTIONS:  1. Assess patient/family's coping skills and  non-compliant behavior (including use of illegal substances)  2. Notify security of behavior or suspected illegal substances which indicate the need for search of the family and/or belongings  3. Encourage verbalization of thoughts and concerns in a socially appropriate manner  4. Utilize positive, consistent limit setting strategies supporting safety of

## 2024-07-07 NOTE — PROGRESS NOTES
Patient verbally declined invitation to the following groups:    Community Meeting  Education- a handout on today's group topic- supporting someone with depression was provided to patient.    Patient will continue to be provided with opportunities to enhance leisure skills/interests and/or coping mechanisms.

## 2024-07-07 NOTE — PROGRESS NOTES
BEHAVIORAL HEALTH FOLLOW-UP NOTE     7/7/2024     Patient was seen and examined in person, Chart reviewed   Patient's case discussed with staff/team    Chief Complaint: \"I am doing good.\"    Interim History:   Patient seen this morning in his room he tells me that he is doing \"good.\"  He denies suicidal ideations intent or plan denies any auditory or visual hallucinations he has been attending groups out visible in the milieu per staff and socializing with peers last night suicide ideations intent or plan denies auditory or visual hallucinations mom reportedly visited yesterday.  No behavior disturbances noted mood improving      appetite: [x] Normal/Unchanged  [] Increased  [] Decreased      Sleep:       [x] Normal/Unchanged  [] Fair       [] Poor              Energy:    [x] Normal/Unchanged  [] Increased  [] Decreased        SI [] Present  [x] Absent    HI  []Present  [x] Absent     Aggression:  [] yes  [x] no    Patient is [x] able  [] unable to CONTRACT FOR SAFETY     PAST MEDICAL/PSYCHIATRIC HISTORY:   Past Medical History:   Diagnosis Date    ADD (attention deficit disorder)     Bipolar 1 disorder (HCC)        FAMILY/SOCIAL HISTORY:  No family history on file.  Social History     Socioeconomic History    Marital status: Single     Spouse name: Not on file    Number of children: Not on file    Years of education: Not on file    Highest education level: Not on file   Occupational History    Occupation:    Tobacco Use    Smoking status: Every Day     Current packs/day: 0.50     Average packs/day: 0.5 packs/day for 8.0 years (4.0 ttl pk-yrs)     Types: Cigarettes    Smokeless tobacco: Never    Tobacco comments:     Smokes 8-9 cigarettes / day   Vaping Use    Vaping Use: Never used   Substance and Sexual Activity    Alcohol use: Yes     Comment: occasionally    Drug use: No    Sexual activity: Not on file   Other Topics Concern    Not on file   Social History Narrative    Not on file     Social  (VISTARIL) capsule 50 mg, 50 mg, Oral, TID PRN, Dellick, Kori B, APRN - CNP    haloperidol (HALDOL) tablet 5 mg, 5 mg, Oral, Q6H PRN **OR** haloperidol lactate (HALDOL) injection 5 mg, 5 mg, IntraMUSCular, Q6H PRN, Dellick, Kori B, APRN - CNP    melatonin tablet 3 mg, 3 mg, Oral, Nightly PRN, Dellick, Kori B, APRN - CNP, 3 mg at 07/06/24 2058    LORazepam (ATIVAN) injection 2 mg, 2 mg, IntraMUSCular, Q6H PRN, Dellick, Kori B, APRN - CNP    diphenhydrAMINE (BENADRYL) injection 50 mg, 50 mg, IntraMUSCular, Q6H PRN, Dellick, Kori B, APRN - CNP    nicotine polacrilex (COMMIT) lozenge 2 mg, 2 mg, Oral, Q2H PRN, Pam Guzmán MD    benztropine (COGENTIN) tablet 2 mg, 2 mg, Oral, Daily, Dellick, Kori B, APRN - CNP, 2 mg at 07/07/24 0849    OLANZapine (ZYPREXA) tablet 20 mg, 20 mg, Oral, Nightly, Dellick, Kori B, APRN - CNP, 20 mg at 07/06/24 2058    divalproex (DEPAKOTE) DR tablet 250 mg, 250 mg, Oral, 2 times per day, Dellick, Kori B, APRN - CNP, 250 mg at 07/07/24 0849    potassium chloride (KLOR-CON M) extended release tablet 40 mEq, 40 mEq, Oral, Once, Handy Verduzco,       Examination:  BP (!) 149/65   Pulse (!) 106   Temp 97.1 °F (36.2 °C) (Oral)   Resp 16   Ht 1.88 m (6' 2\")   Wt 79.4 kg (175 lb)   SpO2 98%   BMI 22.47 kg/m²   Gait - steady  Medication side effects(SE):     Mental Status Examination:    Level of consciousness:  within normal limits   Appearance:  fair grooming and fair hygiene  Behavior/Motor:  no abnormalities noted  Attitude toward examiner:  cooperative  Speech:  spontaneous, normal rate and normal volume   Mood: \" I am okay.\"  Affect: Mood incongruent flat and blunted  Thought processes: Poverty of thought  Thought content: Appears preoccupied guarded if these sedatives staff reports visual hallucinations denies suicidal or homicidal ideations intent or plan     language: able to name objects and repeate phrases  Remote Memory: intact  Recent Memory: intact  Cognition:

## 2024-07-07 NOTE — GROUP NOTE
Date: 7/7/2024  Start Time: 1530  End Time:  1630  Number of Participants: 13    Type of Group: Recreational    Wellness Binder Information  Module Name:  Proxama Game    Patient's Goal:  To increase socialization amongst peers.  To introduce potential new leisure interests.    Notes:  CTRS facilitated a game called the MedyMatch it Game.  Patient was an active participant in activity and exhibited a receptive behavior.  Patient was social amongst peers.     Status After Intervention:  Improved    Participation Level: Active Listener and Interactive    Participation Quality: Appropriate, Attentive, and Sharing      Speech:  normal      Thought Process/Content: Logical      Affective Functioning: Congruent      Mood: euthymic      Level of consciousness:  Alert and Attentive      Response to Learning: Able to verbalize current knowledge/experience, Able to verbalize/acknowledge new learning, Able to retain information, and Progressing to goal      Endings: None Reported    Modes of Intervention: Education, Socialization, Exploration, and Activity      Discipline Responsible: Recreational Therapist      Signature:  CARISA Zapien    Group Therapy Note    Attendees: 13

## 2024-07-08 VITALS
HEIGHT: 74 IN | HEART RATE: 74 BPM | RESPIRATION RATE: 16 BRPM | TEMPERATURE: 98.2 F | BODY MASS INDEX: 22.46 KG/M2 | WEIGHT: 175 LBS | OXYGEN SATURATION: 96 % | DIASTOLIC BLOOD PRESSURE: 62 MMHG | SYSTOLIC BLOOD PRESSURE: 106 MMHG

## 2024-07-08 PROCEDURE — 99239 HOSP IP/OBS DSCHRG MGMT >30: CPT | Performed by: NURSE PRACTITIONER

## 2024-07-08 PROCEDURE — 6370000000 HC RX 637 (ALT 250 FOR IP): Performed by: NURSE PRACTITIONER

## 2024-07-08 RX ORDER — DIVALPROEX SODIUM 250 MG/1
250 TABLET, DELAYED RELEASE ORAL EVERY 12 HOURS SCHEDULED
Qty: 60 TABLET | Refills: 0
Start: 2024-07-08 | End: 2024-07-08

## 2024-07-08 RX ORDER — DIVALPROEX SODIUM 250 MG/1
250 TABLET, DELAYED RELEASE ORAL EVERY 12 HOURS SCHEDULED
Qty: 60 TABLET | Refills: 0 | Status: SHIPPED | OUTPATIENT
Start: 2024-07-08 | End: 2024-08-07

## 2024-07-08 RX ORDER — POLYETHYLENE GLYCOL 3350 17 G
2 POWDER IN PACKET (EA) ORAL
Qty: 100 EACH | Refills: 3 | COMMUNITY
Start: 2024-07-08 | End: 2024-08-07

## 2024-07-08 RX ADMIN — BENZTROPINE MESYLATE 2 MG: 2 TABLET ORAL at 10:02

## 2024-07-08 RX ADMIN — DIVALPROEX SODIUM 250 MG: 250 TABLET, DELAYED RELEASE ORAL at 10:02

## 2024-07-08 NOTE — PLAN OF CARE
Problem: Self Harm/Suicidality  Goal: Will have no self-injury during hospital stay  Description: INTERVENTIONS:  1.  Ensure constant observer at bedside with Q15M safety checks  2.  Maintain a safe environment  3.  Secure patient belongings  4.  Ensure family/visitors adhere to safety recommendations  5.  Ensure safety tray has been added to patient's diet order  6.  Every shift and PRN: Re-assess suicidal risk via Frequent Screener    Outcome: Progressing     Problem: María  Goal: Will exhibit normal sleep and speech and no impulsivity  Description: INTERVENTIONS:  1. Administer medication as ordered  2. Set limits on impulsive behavior  3. Make attempts to decrease external stimuli as possible  Outcome: Progressing     Problem: Psychosis  Goal: Will report no hallucinations or delusions  Description: INTERVENTIONS:  1. Administer medication as  ordered  2. Assist with reality testing to support increasing orientation  3. Assess if patient's hallucinations or delusions are encouraging self harm or harm to others and intervene as appropriate  Outcome: Progressing     Problem: Behavior  Goal: Pt/Family maintain appropriate behavior and adhere to behavioral management agreement, if implemented  Description: INTERVENTIONS:  1. Assess patient/family's coping skills and  non-compliant behavior (including use of illegal substances)  2. Notify security of behavior or suspected illegal substances which indicate the need for search of the family and/or belongings  3. Encourage verbalization of thoughts and concerns in a socially appropriate manner  4. Utilize positive, consistent limit setting strategies supporting safety of patient, staff and others  5. Encourage participation in the decision making process about the behavioral management agreement  6. If a visitor's behavior poses a threat to safety call refer to organization policy.  7. Initiate consult with , Psychosocial CNS, Spiritual Care as  appropriate  Outcome: Progressing     Problem: Anxiety  Goal: Will report anxiety at manageable levels  Description: INTERVENTIONS:  1. Administer medication as ordered  2. Teach and rehearse alternative coping skills  3. Provide emotional support with 1:1 interaction with staff  Outcome: Progressing     Problem: Sleep Disturbance  Goal: Will exhibit normal sleeping pattern  Description: INTERVENTIONS:  1. Administer medication as ordered  2. Decrease environmental stimuli, including noise, as appropriate  3. Discourage social isolation and naps during the day  Outcome: Progressing     Problem: Involuntary Admit  Goal: Will cooperate with staff recommendations and doctor's orders and will demonstrate appropriate behavior  Description: INTERVENTIONS:  1. Treat underlying conditions and offer medication as ordered  2. Educate regarding involuntary admission procedures and rules  3. Contain excessive/inappropriate behavior per unit and hospital policies  Outcome: Progressing     Problem: Risk for Elopement  Goal: Patient will not exit the unit/facility without proper excort  Outcome: Progressing     Problem: Pain  Goal: Verbalizes/displays adequate comfort level or baseline comfort level  Outcome: Progressing       Patient in room upon approach. Patient denied anxiety and depression. Patient remains med compliant and behavior remains in control. Patient pleasant and cooperative. Patient is able to make needs known. Safety rounds done q15 minutes. Will continue to monitor.

## 2024-07-08 NOTE — PROGRESS NOTES
CLINICAL PHARMACY NOTE: MEDS TO BEDS    Total # of Prescriptions Filled: 1   The following medications were delivered to the patient:  Divalproex sodium 250 mg    Additional Documentation:   Pt picked up in the pharmacy

## 2024-07-08 NOTE — BH NOTE
[facility]  Discharge Note    Pt discharged with followings belongings:   Clothing: Footwear, Pants, Shorts, Shirt, Socks, Undergarments (Shirts 5, 3 jeans)  Other Valuables: Money ($10.00)   Valuables returned to patient. Patient educated on aftercare instructions: YES  Patient verbalize understanding of AVS:  YES.    Status EXAM upon discharge:  Mental Status and Behavioral Exam  Normal: No  Level of Assistance: Independent/Self  Facial Expression: Brightened  Affect: Stable  Level of Consciousness: Alert  Frequency of Checks: 4 times per hour, close  Mood:Normal: No  Mood: Depressed, Anxious  Motor Activity:Normal: Yes  Motor Activity: Decreased  Eye Contact: Good  Observed Behavior: Friendly, Cooperative  Sexual Misconduct History: Current - no  Preception: [place] to time, [place] to place, [place] to situation, [place] to person  Attention:Normal: Yes  Attention: Distractible  Thought Processes: Unremarkable  Thought Content:Normal: Yes  Thought Content: Preoccupations  Depression Symptoms: Isolative  Anxiety Symptoms: Generalized  María Symptoms: No problems reported or observed.  Hallucinations: None  Delusions: No  Delusions: Other (comment) (none voiced)  Memory:Normal: Yes  Memory: Poor recent  Insight and Judgment: Yes  Insight and Judgment: Poor insight, Poor judgment    Tobacco Screening:  Practical Counseling, on admission, liliana X, if applicable and completed (first 3 are required if patient doesn't refuse)REFUSED:            ( ) Recognizing danger situations (included triggers and roadblocks)                    ( ) Coping skills (new ways to manage stress,relaxation techniques, changing routine, distraction)                                                           ( ) Basic information about quitting (benefits of quitting, techniques in how to quit, available resources  ( ) [facility]                                                                                                                    ( ) Patient refused counseling  (XX ) Patient refused referral  ( ) Patient refused prescription upon discharge  ( ) Patient has not smoked in the last 30 days    Metabolic Screening:    Lab Results   Component Value Date    LABA1C 5.6 07/03/2024       Lab Results   Component Value Date    CHOL 123 07/03/2024    CHOL 129 04/26/2017    CHOL 117 04/02/2015     Lab Results   Component Value Date    TRIG 81 07/03/2024    TRIG 44 04/26/2017    TRIG 105 04/02/2015     Lab Results   Component Value Date    HDL 37 (L) 07/03/2024    HDL 51 04/26/2017    HDL 44 04/02/2015     No components found for: \"LDLCAL\"  No components found for: \"LABVLDL\"    Margarita Arguelles RN

## 2024-07-08 NOTE — CARE COORDINATION
Pt was seen during treatment team. Pt reports feeling good today, denied SI/HI/AVH. Pt expressed feeling much better and ready to be discharged home. Pt feels back to himself. Pt will continue to treat with [facility] at time of discharge. A [facility] report was completed on 7/2, see note for details. Collateral was gained from pt mom who confirmed the pt will return home and he does not have access to any guns or weapons. RN obtained repeat collateral on 7/8 with mom who stated she believes the pt is ready to be discharged. Pt cooperative, pleasant, with good eye contact, clear speech, improved insight/judgement.     In order to ensure appropriate transition and discharge planning is in place, the following documents have been transmitted to [facility], as the new outpatient provider:    The d/c diagnosis was transmitted to the next care provider  The reason for hospitalization was transmitted to the next care provider  The d/c medications (dosage and indication) were transmitted to the next care provider   The continuing care plan was transmitted to the next care provider
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
## 2024-07-08 NOTE — GROUP NOTE
Group Therapy Note    Date: 7/8/2024    Group Start Time: 1110  Group End Time: 1155  Group Topic: Psychotherapy    [facility]    Elis Camara MSW, LSW        Group Therapy Note    Attendees: 12       Patient's Goal:  To increase social interaction and improve relationships with others.      Notes:  Pt was attentive in group and was able to identify an agenda. They were also able to verbalize relating to others within the group.     Status After Intervention:  Unchanged    Participation Level: Active Listener    Participation Quality: Appropriate and Attentive      Speech:  hesitant      Thought Process/Content: Logical      Affective Functioning: Flat      Mood: depressed      Level of consciousness:  Alert and Attentive      Response to Learning: Able to verbalize current knowledge/experience and Able to retain information      Endings: None Reported    Modes of Intervention: Education, Support, Socialization, Exploration, Clarifying, and Problem-solving      Discipline Responsible: /Counselor      Signature:  ELI Flores LSW

## 2024-12-28 ENCOUNTER — HOSPITAL ENCOUNTER (INPATIENT)
Age: 39
LOS: 6 days | Discharge: HOME | End: 2025-01-03
Attending: EMERGENCY MEDICINE | Admitting: PSYCHIATRY & NEUROLOGY
Payer: COMMERCIAL

## 2024-12-28 DIAGNOSIS — Z76.89 ENCOUNTER FOR PSYCHIATRIC ASSESSMENT: Primary | ICD-10-CM

## 2024-12-28 PROBLEM — F20.0 PARANOID SCHIZOPHRENIA (HCC): Status: ACTIVE | Noted: 2024-12-28

## 2024-12-28 LAB
ALBUMIN SERPL-MCNC: 4 G/DL (ref 3.5–5.2)
ALP SERPL-CCNC: 67 U/L (ref 40–129)
ALT SERPL-CCNC: 9 U/L (ref 0–40)
AMPHET UR QL SCN: NEGATIVE
ANION GAP SERPL CALCULATED.3IONS-SCNC: 13 MMOL/L (ref 7–16)
APAP SERPL-MCNC: <5 UG/ML (ref 10–30)
AST SERPL-CCNC: 16 U/L (ref 0–39)
BARBITURATES UR QL SCN: NEGATIVE
BASOPHILS # BLD: 0.04 K/UL (ref 0–0.2)
BASOPHILS NFR BLD: 1 % (ref 0–2)
BENZODIAZ UR QL: NEGATIVE
BILIRUB SERPL-MCNC: 0.2 MG/DL (ref 0–1.2)
BUN SERPL-MCNC: 10 MG/DL (ref 6–20)
BUPRENORPHINE UR QL: NEGATIVE
CALCIUM SERPL-MCNC: 8.7 MG/DL (ref 8.6–10.2)
CANNABINOIDS UR QL SCN: NEGATIVE
CHLORIDE SERPL-SCNC: 104 MMOL/L (ref 98–107)
CO2 SERPL-SCNC: 20 MMOL/L (ref 22–29)
COCAINE UR QL SCN: NEGATIVE
CREAT SERPL-MCNC: 1.1 MG/DL (ref 0.7–1.2)
DATE LAST DOSE: NORMAL
EOSINOPHIL # BLD: 0.03 K/UL (ref 0.05–0.5)
EOSINOPHILS RELATIVE PERCENT: 1 % (ref 0–6)
ERYTHROCYTE [DISTWIDTH] IN BLOOD BY AUTOMATED COUNT: 13.3 % (ref 11.5–15)
ETHANOLAMINE SERPL-MCNC: <10 MG/DL (ref 0–0.08)
FENTANYL UR QL: NEGATIVE
GFR, ESTIMATED: >90 ML/MIN/1.73M2
GLUCOSE SERPL-MCNC: 87 MG/DL (ref 74–99)
HCT VFR BLD AUTO: 35.2 % (ref 37–54)
HGB BLD-MCNC: 11.8 G/DL (ref 12.5–16.5)
IMM GRANULOCYTES # BLD AUTO: <0.03 K/UL (ref 0–0.58)
IMM GRANULOCYTES NFR BLD: 0 % (ref 0–5)
LYMPHOCYTES NFR BLD: 3.33 K/UL (ref 1.5–4)
LYMPHOCYTES RELATIVE PERCENT: 56 % (ref 20–42)
MCH RBC QN AUTO: 29 PG (ref 26–35)
MCHC RBC AUTO-ENTMCNC: 33.5 G/DL (ref 32–34.5)
MCV RBC AUTO: 86.5 FL (ref 80–99.9)
METHADONE UR QL: NEGATIVE
MONOCYTES NFR BLD: 0.45 K/UL (ref 0.1–0.95)
MONOCYTES NFR BLD: 8 % (ref 2–12)
NEUTROPHILS NFR BLD: 35 % (ref 43–80)
NEUTS SEG NFR BLD: 2.04 K/UL (ref 1.8–7.3)
OPIATES UR QL SCN: NEGATIVE
OXYCODONE UR QL SCN: NEGATIVE
PCP UR QL SCN: NEGATIVE
PLATELET # BLD AUTO: 180 K/UL (ref 130–450)
PMV BLD AUTO: 10.2 FL (ref 7–12)
POTASSIUM SERPL-SCNC: 4 MMOL/L (ref 3.5–5)
PROT SERPL-MCNC: 6.7 G/DL (ref 6.4–8.3)
RBC # BLD AUTO: 4.07 M/UL (ref 3.8–5.8)
SALICYLATES SERPL-MCNC: <0.3 MG/DL (ref 0–30)
SODIUM SERPL-SCNC: 137 MMOL/L (ref 132–146)
TEST INFORMATION: NORMAL
TME LAST DOSE: NORMAL H
TOXIC TRICYCLIC SC,BLOOD: NEGATIVE
VALPROATE SERPL-MCNC: 62 UG/ML (ref 50–100)
VANCOMYCIN DOSE: NORMAL MG
WBC OTHER # BLD: 5.9 K/UL (ref 4.5–11.5)

## 2024-12-28 PROCEDURE — 1240000000 HC EMOTIONAL WELLNESS R&B

## 2024-12-28 PROCEDURE — 80164 ASSAY DIPROPYLACETIC ACD TOT: CPT

## 2024-12-28 PROCEDURE — 6370000000 HC RX 637 (ALT 250 FOR IP)

## 2024-12-28 PROCEDURE — 90792 PSYCH DIAG EVAL W/MED SRVCS: CPT

## 2024-12-28 PROCEDURE — 90791 PSYCH DIAGNOSTIC EVALUATION: CPT

## 2024-12-28 PROCEDURE — 80307 DRUG TEST PRSMV CHEM ANLYZR: CPT

## 2024-12-28 PROCEDURE — 85025 COMPLETE CBC W/AUTO DIFF WBC: CPT

## 2024-12-28 PROCEDURE — 93005 ELECTROCARDIOGRAM TRACING: CPT | Performed by: EMERGENCY MEDICINE

## 2024-12-28 PROCEDURE — G0480 DRUG TEST DEF 1-7 CLASSES: HCPCS

## 2024-12-28 PROCEDURE — 80179 DRUG ASSAY SALICYLATE: CPT

## 2024-12-28 PROCEDURE — 80053 COMPREHEN METABOLIC PANEL: CPT

## 2024-12-28 PROCEDURE — 80143 DRUG ASSAY ACETAMINOPHEN: CPT

## 2024-12-28 PROCEDURE — 99285 EMERGENCY DEPT VISIT HI MDM: CPT

## 2024-12-28 PROCEDURE — 6370000000 HC RX 637 (ALT 250 FOR IP): Performed by: PSYCHIATRY & NEUROLOGY

## 2024-12-28 RX ORDER — MAGNESIUM HYDROXIDE/ALUMINUM HYDROXICE/SIMETHICONE 120; 1200; 1200 MG/30ML; MG/30ML; MG/30ML
30 SUSPENSION ORAL PRN
Status: DISCONTINUED | OUTPATIENT
Start: 2024-12-28 | End: 2025-01-03 | Stop reason: HOSPADM

## 2024-12-28 RX ORDER — HALOPERIDOL 5 MG/ML
3 INJECTION INTRAMUSCULAR EVERY 6 HOURS PRN
Status: DISCONTINUED | OUTPATIENT
Start: 2024-12-28 | End: 2025-01-03 | Stop reason: HOSPADM

## 2024-12-28 RX ORDER — HYDROXYZINE PAMOATE 25 MG/1
25 CAPSULE ORAL 3 TIMES DAILY PRN
Status: DISCONTINUED | OUTPATIENT
Start: 2024-12-28 | End: 2025-01-03 | Stop reason: HOSPADM

## 2024-12-28 RX ORDER — ACETAMINOPHEN 325 MG/1
650 TABLET ORAL EVERY 4 HOURS PRN
Status: DISCONTINUED | OUTPATIENT
Start: 2024-12-28 | End: 2025-01-03 | Stop reason: HOSPADM

## 2024-12-28 RX ORDER — NICOTINE 21 MG/24HR
1 PATCH, TRANSDERMAL 24 HOURS TRANSDERMAL DAILY
Status: DISCONTINUED | OUTPATIENT
Start: 2024-12-28 | End: 2025-01-03 | Stop reason: HOSPADM

## 2024-12-28 RX ORDER — HALOPERIDOL 2 MG/1
3 TABLET ORAL EVERY 6 HOURS PRN
Status: DISCONTINUED | OUTPATIENT
Start: 2024-12-28 | End: 2025-01-03 | Stop reason: HOSPADM

## 2024-12-28 RX ORDER — BENZTROPINE MESYLATE 2 MG/1
2 TABLET ORAL NIGHTLY
Status: DISCONTINUED | OUTPATIENT
Start: 2024-12-28 | End: 2025-01-03 | Stop reason: HOSPADM

## 2024-12-28 RX ORDER — DIVALPROEX SODIUM 250 MG/1
250 TABLET, DELAYED RELEASE ORAL EVERY 12 HOURS SCHEDULED
Status: DISCONTINUED | OUTPATIENT
Start: 2024-12-28 | End: 2025-01-02

## 2024-12-28 RX ORDER — RISPERIDONE 1 MG/1
1 TABLET ORAL 2 TIMES DAILY
Status: DISCONTINUED | OUTPATIENT
Start: 2024-12-28 | End: 2024-12-30

## 2024-12-28 RX ADMIN — RISPERIDONE 1 MG: 1 TABLET, FILM COATED ORAL at 12:53

## 2024-12-28 RX ADMIN — DIVALPROEX SODIUM 250 MG: 250 TABLET, DELAYED RELEASE ORAL at 20:55

## 2024-12-28 RX ADMIN — RISPERIDONE 1 MG: 1 TABLET, FILM COATED ORAL at 20:55

## 2024-12-28 RX ADMIN — Medication 3 MG: at 20:55

## 2024-12-28 RX ADMIN — BENZTROPINE MESYLATE 2 MG: 2 TABLET ORAL at 20:55

## 2024-12-28 RX ADMIN — HYDROXYZINE PAMOATE 25 MG: 25 CAPSULE ORAL at 20:55

## 2024-12-28 ASSESSMENT — PAIN - FUNCTIONAL ASSESSMENT: PAIN_FUNCTIONAL_ASSESSMENT: NONE - DENIES PAIN

## 2024-12-28 ASSESSMENT — SLEEP AND FATIGUE QUESTIONNAIRES
AVERAGE NUMBER OF SLEEP HOURS: 9
DO YOU HAVE DIFFICULTY SLEEPING: NO
AVERAGE NUMBER OF SLEEP HOURS: 8
DO YOU USE A SLEEP AID: NO
DO YOU HAVE DIFFICULTY SLEEPING: NO
DO YOU USE A SLEEP AID: NO

## 2024-12-28 ASSESSMENT — PATIENT HEALTH QUESTIONNAIRE - PHQ9
SUM OF ALL RESPONSES TO PHQ QUESTIONS 1-9: 1
2. FEELING DOWN, DEPRESSED OR HOPELESS: SEVERAL DAYS
SUM OF ALL RESPONSES TO PHQ QUESTIONS 1-9: 1
SUM OF ALL RESPONSES TO PHQ QUESTIONS 1-9: 1
1. LITTLE INTEREST OR PLEASURE IN DOING THINGS: NOT AT ALL
SUM OF ALL RESPONSES TO PHQ9 QUESTIONS 1 & 2: 1
SUM OF ALL RESPONSES TO PHQ QUESTIONS 1-9: 1

## 2024-12-28 ASSESSMENT — LIFESTYLE VARIABLES
HOW OFTEN DO YOU HAVE A DRINK CONTAINING ALCOHOL: NEVER
HOW MANY STANDARD DRINKS CONTAINING ALCOHOL DO YOU HAVE ON A TYPICAL DAY: PATIENT DOES NOT DRINK
HOW OFTEN DO YOU HAVE A DRINK CONTAINING ALCOHOL: NEVER
HOW MANY STANDARD DRINKS CONTAINING ALCOHOL DO YOU HAVE ON A TYPICAL DAY: PATIENT DOES NOT DRINK
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
## 2024-12-28 NOTE — PROGRESS NOTES
CTRS attempted to facilitate group programming on unit.  Patient was observed laying in bed with eyes closed and did not respond to verbal cues/prompts.  CTRS had no participation to facilitate group programming at this time on milieu.  Patient will continue to be provided with opportunities to enhance leisure skills/interests and/or coping mechanisms.

## 2024-12-28 NOTE — BH NOTE
[facility]  Admission Note     Patient admitted from section G. He states he felt like has having a panic attack and came to the ER. He denies SI/HI. He does report hearing voices at times, they are not command in nature. He denies visual hallucinations. He states at times he feels like he is getting messages through the tv and radio. He states his recent stressors include a family friend and his aunt dying recently. He reports feeling helpless and hopeless at times. He states he has been taking his medications. He lives with his mom. His sister is very supportive. He has a flat affect. He is calm and cooperative on assessment. He does appear to be guarded and thought blocking.     Admission Type:   Admission Type: Voluntary    Reason for admission:  Reason for Admission: \"I was having an anxiety attack\"      Addictive Behavior:   Addictive Behavior  In the Past 3 Months, Have You Felt or Has Someone Told You That You Have a Problem With  : Internet use, Sex/pornography    Medical Problems:   Past Medical History:   Diagnosis Date    ADD (attention deficit disorder)     Bipolar 1 disorder (HCC)        Status EXAM:  Mental Status and Behavioral Exam  Normal: No  Level of Assistance: Independent/Self  Facial Expression: Flat  Affect: Blunt  Level of Consciousness: Alert  Frequency of Checks: 4 times per hour, close  Mood:Normal: No  Mood: Depressed, Sad, Anxious  Motor Activity:Normal: No  Motor Activity: Decreased  Eye Contact: Fair  Observed Behavior: Cooperative, Guarded, Withdrawn  Sexual Misconduct History: Current - no  Preception: [place] to person, [place] to time, [place] to place, [place] to situation  Attention:Normal: Yes  Thought Processes: Blocking  Thought Content:Normal: No  Thought Content: Delusions, Paranoia  Depression Symptoms: Feelings of helplessness, Feelings of hopelessess, Loss of interest  Anxiety Symptoms: Generalized  María Symptoms: No problems reported or

## 2024-12-28 NOTE — DISCHARGE INSTRUCTIONS
Follow up for Tobacco Cessation at:    [facility]                                 Date:  Friday 1/10 at 10am              [address]    [place] [address]   (Inside [facility]    take B elevators to 7th floor)   Phone: [phone]   Fax: [phone]

## 2024-12-28 NOTE — BH NOTE
4 Eyes Skin Assessment     NAME:  Iam Cagle  YOB: 1985  MEDICAL RECORD NUMBER:  [id number]    The patient is being assessed for  Admission    I agree that at least one RN has performed a thorough Head to Toe Skin Assessment on the patient. ALL assessment sites listed below have been assessed.      Areas assessed by both nurses:    Head, Face, Ears, Shoulders, Back, Chest, Arms, Elbows, Hands, Sacrum. Buttock, Coccyx, Ischium, and Legs. Feet and Heels        Does the Patient have a Wound? No noted wound(s)       Albert Prevention initiated by RN: No  Wound Care Orders initiated by RN: No    Pressure Injury (Stage 3,4, Unstageable, DTI, NWPT, and Complex wounds) if present, place Wound referral order by RN under : No    New Ostomies, if present place, Ostomy referral order under : No     Nurse 1 eSignature: Electronically signed by Elisha Correa RN on 12/28/24 at 9:34 AM EST    **SHARE this note so that the co-signing nurse can place an eSignature**    Nurse 2 eSignature: Electronically signed by Millie Scott RN on 12/28/24 at 9:40 AM EST

## 2024-12-28 NOTE — BH NOTE
[facility]  Initial Interdisciplinary Treatment Plan Note      Original treatment plan Date & Time: 12/28/24 0900    Admission Type:  Admission Type: Voluntary    Reason for admission:   Reason for Admission: \"I was having an anxiety attack\"    Estimated Length of Stay:  5-7days  Estimated Discharge Date: To be determined by physician.    PATIENT STRENGTHS:  Patient Strengths:   Patient Strengths and Limitations:   Addictive Behavior: Addictive Behavior  In the Past 3 Months, Have You Felt or Has Someone Told You That You Have a Problem With  : Internet use, Sex/pornography  Medical Problems:  Past Medical History:   Diagnosis Date    ADD (attention deficit disorder)     Bipolar 1 disorder (HCC)      Status EXAM:Mental Status and Behavioral Exam  Normal: No  Level of Assistance: Independent/Self  Facial Expression: Flat  Affect: Constricted  Level of Consciousness: Alert  Frequency of Checks: 4 times per hour, close  Mood:Normal: Yes  Mood: Depressed  Motor Activity:Normal: No  Motor Activity: Decreased  Eye Contact: Fair  Observed Behavior: Cooperative, Guarded  Sexual Misconduct History: Current - no  Preception: [place] to person, [place] to place, [place] to situation, [place] to time  Attention:Normal: Yes  Attention: Others (comment)  Thought Processes: Blocking  Thought Content:Normal: No  Thought Content: Poverty of content  Depression Symptoms: Feelings of hopelessess, Feelings of helplessness, Isolative  Anxiety Symptoms: Generalized  María Symptoms: No problems reported or observed.  Hallucinations: None (denied on assessment)  Delusions: Yes  Delusions: Paranoid  Memory:Normal: No  Memory: Poor recent  Insight and Judgment: No  Insight and Judgment: Poor judgment, Poor insight    EDUCATION:   Learner Progress Toward Treatment Goals: Will review group plans and strategies for care.    Method: Group therapy, Medication compliance, Individualized assessments and Care planning.    Outcome: Needs  Reinforcement    PATIENT GOALS: To be discussed with patient within 72 hours    PLAN/TREATMENT RECOMMENDATIONS:     Continue group therapy , Medications compliance, Goal setting, Individualized assessments and Care planning, continue to monitor patient on unit.      SHORT-TERM GOALS:   Time frame for Short-Term Goals: 5-7 days    LONG-TERM GOALS:  Time frame for Long-Term Goals: 6 months  Members Present in Team Meeting: See Signature Sheet    Elisha Correa RN

## 2024-12-28 NOTE — PLAN OF CARE
Problem: Anxiety  Goal: Will report anxiety at manageable levels  Description: INTERVENTIONS:  1. Administer medication as ordered  2. Teach and rehearse alternative coping skills  3. Provide emotional support with 1:1 interaction with staff  Outcome: Progressing     Problem: Coping  Goal: Pt/Family able to verbalize concerns and demonstrate effective coping strategies  Description: INTERVENTIONS:  1. Assist patient/family to identify coping skills, available support systems and cultural and spiritual values  2. Provide emotional support, including active listening and acknowledgement of concerns of patient and caregivers  3. Reduce environmental stimuli, as able  4. Instruct patient/family in relaxation techniques, as appropriate  5. Assess for spiritual pain/suffering and initiate Spiritual Care, Psychosocial Clinical Specialist consults as needed  Outcome: Progressing     Problem: Decision Making  Goal: Pt/Family able to effectively weigh alternatives and participate in decision making related to treatment and care  Description: INTERVENTIONS:  1. Determine when there are differences between patient's view, family's view, and healthcare provider's view of condition  2. Facilitate patient and family articulation of goals for care  3. Help patient and family identify pros/cons of alternative solutions  4. Provide information as requested by patient/family  5. Respect patient/family right to receive or not to receive information  6. Serve as a liaison between patient and family and health care team  7. Initiate Consults from Ethics, Palliative Care or initiate Family Care Conference as is appropriate  Outcome: Progressing     Problem: Confusion  Goal: Confusion, delirium, dementia, or psychosis is improved or at baseline  Description: INTERVENTIONS:  1. Assess for possible contributors to thought disturbance, including medications, impaired vision or hearing, underlying metabolic abnormalities, dehydration,  ideation. If patient expresses suicidal thoughts or statements do not leave alone, initiate Suicide Precautions, move to a room close to the nursing station and obtain sitter  5. Initiate consults as appropriate with Mental Health Professional, Spiritual Care, Psychosocial CNS, and consider a recommendation to the LIP for a Psychiatric Consultation  Outcome: Progressing     Problem: Risk for Elopement  Goal: Patient will not exit the unit/facility without proper excort  Outcome: Progressing  Flowsheets  Taken 12/28/2024 0923 by Elisha Correa, RN  Nursing Interventions for Elopement Risk: Assist with personal care needs such as toileting, eating, dressing, as needed to reduce the risk of wandering  Taken 12/28/2024 0059 by Christina Cash, RN  Nursing Interventions for Elopement Risk:   Assist with personal care needs such as toileting, eating, dressing, as needed to reduce the risk of wandering   Collaborate with treatment team for drug withdrawal symptoms treatment   Communicate/escalate to charge nurse the risk of elopement

## 2024-12-28 NOTE — ED PROVIDER NOTES
[facility] EMERGENCY DEPARTMENT  EMERGENCY DEPARTMENT ENCOUNTER        Pt Name: Iam Cagle  MRN: [id number]  Birthdate 1985  Date of evaluation: 12/28/2024  Provider: Handy Verduzco DO  PCP: Kaushal Rojo MD  Note Started: 2:35 AM EST 12/28/24    CHIEF COMPLAINT       Chief Complaint   Patient presents with    Psychiatric Evaluation     Patient states he is going through a lot of things and has been hearing voices and seeing people. He states he is afraid they are going to be coming after him. Admits to paranoia. +AVH. DENIES SI/HI. States he is med compliant.        HISTORY OF PRESENT ILLNESS: 1 or more Elements   History From: Patient    Limitations to history : None    Iam Cagle is a 39 y.o. male who presents to the emerged part for psychiatric evaluation.  The patient has a history of schizophrenia.  The patient states he has been going through a lot of things and has been hearing voices and seeing things.  He states he is afraid they are going to come after him.  He does state that he feels very paranoid.  Denies any SI or HI.  States he has been compliant with his medications.    Nursing Notes were all reviewed and agreed with or any disagreements were addressed in the HPI.      REVIEW OF EXTERNAL NOTE :       PDMP Monitoring:    Last PDMP Rj as Reviewed:  Review User Review Instant Review Result   LUISA CEJA 11/12/2019  1:17 AM Reviewed PDMP [1]     Last Controlled Substance Monitoring Documentation      Flowsheet [facility] ED from 11/12/2019 in [facility] Emergency Department   Periodic Controlled Substance Monitoring No signs of potential drug abuse or diversion identified. filed at 11/12/2019 0117          Urine Drug Screenings (1 yr)       URINE DRUG SCREEN  Collected: 12/28/2024  1:28 AM (Final result)              URINE DRUG SCREEN  Collected: 7/1/2024  3:30 AM (Final result)              URINE DRUG SCREEN

## 2024-12-28 NOTE — VIRTUAL HEALTH
experiencing auditory and visual hallucinations but they are not telling him to harm himself at this time. Patient is disabled and lives at home with hs mom, Lorraine, who is his primary support system. Previous Diagnoses/symptoms: Acute Psychosis, Bipolar I, Auditory and Visual Hallucinations, Schizophrenia, Cannabis Use, Accidental Overdose. He has attempted suicide by overdose in the past. Patient has recently had an increase in depression, isolation, poor hygiene, and sleep. Both patient and Lorraine agree  a psychiatric inpatient hospitalization for further evaluation and treatment is necessary. Patient wants his mental health to improve so he can feel better.    Dx: Schizophrenia    Plan:  Collateral: Contacted patient's jammie Peters at phone [phone]  Inpatient psychiatric admission at appropriate care level facility, once medically cleared and stable  Safety plan created and reviewed with patient, see below for details  Re-consult for any new changes or concerns. Thank you for this consult.  Discussed recommendations with Dr. Handy Verduzco at time of consult completion.    TelePsych recommendations:Inpatient psychiatric admission    Safety Plan:  see below    Electronically signed by Eneida Valle LCSW on 12/28/2024 at 2:49 AM.  Iam Cagle, was evaluated through a synchronous (real-time) audio-video encounter. The patient (and/or guardian if applicable) is aware that this is a billable service, which includes applicable co-pays. This virtual visit was conducted with patient's (and/or legal guardian's) consent. Patient identification was verified, and a caregiver was present when appropriate.  The patient was located at Facility (Appt Department): [facility] EMERGENCY DEPARTMENT  [facility] [address]  Loc: [phone]  The provider was located at Facility (Login Dept): [facility] TELEPSYCHIATRY PROGRAM  [address]  [address]  C/O VIRTUAL HEALTH TELEPSYCH  [facility] [address]  [phone]  Confirm you are appropriately licensed, registered, or certified to deliver care in the state where the patient is located as indicated above. If you are not or unsure, please re-schedule the visit: Yes, I confirm.   [place] Consult to Tele-Psych  Consult performed by: Eneida Valle LCSW  Consult ordered by: Handy Verduzco DO  Reason for consult: Hallucinations         Total time spent on this encounter: 80 minutes    --Eneida Valle LCSW on 12/28/2024 at 2:48 AM    An electronic signature was used to authenticate this note.

## 2024-12-28 NOTE — CARE COORDINATION
CTRS met with patient to complete leisure assessment.       12/28/24 7580   Activities of Daily Living   Patient Requires assistance with daily self-care activities? No   Leisure Activity 1   3 Favorite Leisure Activities \"writing, doing housework\"   Frequency weekly   Last time this week   Barriers to participating  social (ie. no one to do it with);financial/money;motivation   Social   Patient reports spending the majority of their free time alone   Patient verbalizes a preference for spending free time with one other person   Patient’s perception of support system more healthy  (patient reports his mother Samanta is supportive of her care)   Patient’s perception of barriers to socializing with others include(s) lack of comfort;lack of motivation/interest   Social Details patient currently lives with his mother.  He reports he is single and has no children.  Patient reports he receives disability.  He goes to [place] on [employer].  Patient utilizes the bus transport   Beliefs & Coping   Has difficulty dealing with feelings   Yes   Internalizes feelings/Keeps feelings in Yes   Externalizes feelings through aggressiveness or poor temper control  No   Feels uncomfortable around others  Yes   Has difficulty talking to others  Yes   Depends on others for direction or decisions No   Difficulty dealing with anger of others  No   Difficulty dealing with own anger  No   Difficulty managing stress Yes  (\"sometimes just in general\")   Frequently has difficulty with relationships  No   Has recently perceived/experienced loss, disappointment, humiliation or failure  Yes  (friend passed away recently)   General perception about self ambivalent   Attitude about abilities more successful than not   Locus of Control  most of the time   Belief about recovery Recovery is possible   Patient Identified Strengths  \"reading\"   Patient Identified Limitations  \"Understanding conversation more\"   Perception of most stressful event prior to

## 2024-12-28 NOTE — ED NOTES
Patient has been presented to [facility]. Patient accepted to [facility], diagnosis Paranoid Schizophrenia.

## 2024-12-28 NOTE — CARE COORDINATION
Biopsychosocial Assessment Note    Social work met with patient to complete the biopsychosocial assessment and C-SSRS.     Chief Complaint: pt reports \"I had an anxiety attack.\"     Mental Status Exam: pt alert&oriented x4. Pt cooperative, guarded. Pt mood depressed, flat, constricted affect. Pt eye contact was fair. Pt speech was mumbled and difficult to understand. Pt thought blocking, impoverished. Pt insight/judgement poor. Pt denied SI/HI/AVH.     Clinical Summary: pt reports he had an anxiety attack prior to admission. Pt also stated his mom wanted him to get evaluated because of what he was saying at home. When asked to elaborate on this further the pt stated his brother and sisters are mean to him and make him feel uncomfortable. Pt did not elaborate further. Per ER provider note, \" 39 y.o. male who presents to the emerged part for psychiatric evaluation.  The patient has a history of schizophrenia.  The patient states he has been going through a lot of things and has been hearing voices and seeing things.  He states he is afraid they are going to come after him.  He does state that he feels very paranoid.  Denies any SI or HI.  States he has been compliant with his medications.\"    Pt reports previous inpatient psychiatric admission at this facility one year ago. Per chart, pt was admitted to this facility 7/1/2024. Pt is active with [facility] and he reports med compliance. Pt reports a hx of suicidal thoughts when he was in high school but he denied any hx of suicide attempts or self-injurious behaviors. Per chart, \"he attempted to kill himself approximately 10 years ago by overdose.\" Pt denied trauma/abuse hx. Pt denied drug or alcohol use. Pt reports addictive behaviors with sex/pornography and the internet. Pt UDS negative. Pt denied legal hx. Per chart, \"Patient does admit to a history of 1 prior assault charge 7-9 years ago against his niece and nephew after an argument. Patient

## 2024-12-29 PROCEDURE — 99232 SBSQ HOSP IP/OBS MODERATE 35: CPT

## 2024-12-29 PROCEDURE — 1240000000 HC EMOTIONAL WELLNESS R&B

## 2024-12-29 PROCEDURE — 6370000000 HC RX 637 (ALT 250 FOR IP)

## 2024-12-29 PROCEDURE — 6370000000 HC RX 637 (ALT 250 FOR IP): Performed by: PSYCHIATRY & NEUROLOGY

## 2024-12-29 RX ADMIN — RISPERIDONE 1 MG: 1 TABLET, FILM COATED ORAL at 22:27

## 2024-12-29 RX ADMIN — BENZTROPINE MESYLATE 2 MG: 2 TABLET ORAL at 22:27

## 2024-12-29 RX ADMIN — HYDROXYZINE PAMOATE 25 MG: 25 CAPSULE ORAL at 22:28

## 2024-12-29 RX ADMIN — Medication 3 MG: at 22:27

## 2024-12-29 RX ADMIN — DIVALPROEX SODIUM 250 MG: 250 TABLET, DELAYED RELEASE ORAL at 09:56

## 2024-12-29 RX ADMIN — RISPERIDONE 1 MG: 1 TABLET, FILM COATED ORAL at 09:56

## 2024-12-29 RX ADMIN — DIVALPROEX SODIUM 250 MG: 250 TABLET, DELAYED RELEASE ORAL at 22:27

## 2024-12-29 ASSESSMENT — PAIN SCALES - GENERAL: PAINLEVEL_OUTOF10: 0

## 2024-12-29 NOTE — PROGRESS NOTES
Patient declined to attend the following groups:    Community Meeting  Psychoeducation     Will continue to encourage patient to attend programming.

## 2024-12-29 NOTE — PLAN OF CARE
Patient denies suicidal/homicidal ideations and hallucinations. Patient states he is not hearing things but thinks he is hearing his own inner voice. Patient denies anxiety and depression. Patient is friendly and cooperative during assessment, appears to be guarded and thought blocking. Patient is taking ordered medications without issue. Continued support offered to patient. Safety rounds continue.     Problem: Anxiety  Goal: Will report anxiety at manageable levels  Description: INTERVENTIONS:  1. Administer medication as ordered  2. Teach and rehearse alternative coping skills  3. Provide emotional support with 1:1 interaction with staff  12/29/2024 0016 by Alisa Mosqueda, RN  Outcome: Progressing     Problem: Coping  Goal: Pt/Family able to verbalize concerns and demonstrate effective coping strategies  Description: INTERVENTIONS:  1. Assist patient/family to identify coping skills, available support systems and cultural and spiritual values  2. Provide emotional support, including active listening and acknowledgement of concerns of patient and caregivers  3. Reduce environmental stimuli, as able  4. Instruct patient/family in relaxation techniques, as appropriate  5. Assess for spiritual pain/suffering and initiate Spiritual Care, Psychosocial Clinical Specialist consults as needed  12/29/2024 0016 by Alisa Mosqueda, RN  Outcome: Progressing     Problem: Behavior  Goal: Pt/Family maintain appropriate behavior and adhere to behavioral management agreement, if implemented  Description: INTERVENTIONS:  1. Assess patient/family's coping skills and  non-compliant behavior (including use of illegal substances)  2. Notify security of behavior or suspected illegal substances which indicate the need for search of the family and/or belongings  3. Encourage verbalization of thoughts and concerns in a socially appropriate manner  4. Utilize positive, consistent limit setting strategies supporting safety of patient, staff

## 2024-12-29 NOTE — PLAN OF CARE
Patient denies suicidal ideation, homicidal ideation, and AV hallucinations. He is flat, sad, depressed, but he does brighten with conversation. He is calm, cooperative, and friendly. He reports sleeping well. He is compliant with medications, groups encouraged, he is in control of his behaviors at this time.    Problem: Anxiety  Goal: Will report anxiety at manageable levels  Description: INTERVENTIONS:  1. Administer medication as ordered  2. Teach and rehearse alternative coping skills  3. Provide emotional support with 1:1 interaction with staff  12/29/2024 1131 by Elisha Correa RN  Outcome: Progressing     Problem: Coping  Goal: Pt/Family able to verbalize concerns and demonstrate effective coping strategies  Description: INTERVENTIONS:  1. Assist patient/family to identify coping skills, available support systems and cultural and spiritual values  2. Provide emotional support, including active listening and acknowledgement of concerns of patient and caregivers  3. Reduce environmental stimuli, as able  4. Instruct patient/family in relaxation techniques, as appropriate  5. Assess for spiritual pain/suffering and initiate Spiritual Care, Psychosocial Clinical Specialist consults as needed  12/29/2024 1131 by Elisha Correa RN  Outcome: Progressing     Problem: Confusion  Goal: Confusion, delirium, dementia, or psychosis is improved or at baseline  Description: INTERVENTIONS:  1. Assess for possible contributors to thought disturbance, including medications, impaired vision or hearing, underlying metabolic abnormalities, dehydration, psychiatric diagnoses, and notify attending LIP  2. [place] high risk fall precautions, as indicated  3. Provide frequent short contacts to provide reality reorientation, refocusing and direction  4. Decrease environmental stimuli, including noise as appropriate  5. Monitor and intervene to maintain adequate nutrition, hydration, elimination, sleep and activity  6. If

## 2024-12-29 NOTE — CARE COORDINATION
RACHAEL contacted pt's mother Lorraine [phone] (SUSANA signed). No answer, a voicemail was left.

## 2024-12-29 NOTE — PROGRESS NOTES
BEHAVIORAL HEALTH FOLLOW-UP NOTE     12/29/2024     Patient was seen and examined in person, Chart reviewed   Patient's case discussed with staff/team    Chief Complaint: Auditory and visual hallucinations    Interim History:   Patient was seen in his room he is lying in bed he is the covers over his head he does remove them to speak with me.  He denies auditory visual hallucinations does appear preoccupied at times, he states that he believes that the auditory hallucinations were actually his \"inner dialogue\".  He denies suicidal ideation, denies homicidal ideation.  He does remain flat, blunted but does brighten slightly with conversation.  Patient is isolative to room per staff did encourage patient to attend group.  He has been taking his medication, has had no behavioral disturbances on the unit.  Slept 7 hours appetite reported to be fair    Appetite: [x] Normal/Unchanged  [] Increased  [] Decreased      Sleep:       [x] Normal/Unchanged  [] Fair       [] Poor              Energy:    [x] Normal/Unchanged  [] Increased  [] Decreased        SI [] Present  [x] Absent    HI  []Present  [x] Absent     Aggression:  [] yes  [x] no    Patient is [x] able  [] unable to CONTRACT FOR SAFETY     PAST MEDICAL/PSYCHIATRIC HISTORY:   Past Medical History:   Diagnosis Date    ADD (attention deficit disorder)     Bipolar 1 disorder (HCC)        FAMILY/SOCIAL HISTORY:  History reviewed. No pertinent family history.  Social History     Socioeconomic History    Marital status: Single     Spouse name: Not on file    Number of children: Not on file    Years of education: Not on file    Highest education level: Not on file   Occupational History    Occupation:    Tobacco Use    Smoking status: Every Day     Current packs/day: 0.50     Average packs/day: 0.5 packs/day for 8.0 years (4.0 ttl pk-yrs)     Types: Cigarettes    Smokeless tobacco: Never    Tobacco comments:     Smokes 8-9 cigarettes / day   Vaping Use    Vaping  accuracy; however, inadvertent computerized transcription errors may be present.     Electronically signed by OC Song CNP on 12/29/2024 at 10:26 AM

## 2024-12-29 NOTE — GROUP NOTE
Group Therapy Note    Date: 12/29/2024    Group Start Time: 1000  Group End Time: 1030  Group Topic: Cognitive Skills    [facility]    Mary Brand MSW, KRUPA        Group Therapy Note    Attendees: 8       Patient's Goal: to participate in group discussion on active listening.     Notes: pt was an active participant in group.     Status After Intervention:  Improved    Participation Level: Active Listener and Interactive    Participation Quality: Appropriate and Attentive      Speech:  normal      Thought Process/Content: Logical      Affective Functioning: Congruent      Mood: anxious      Level of consciousness:  Alert and Oriented x4      Response to Learning: Able to verbalize current knowledge/experience, Able to verbalize/acknowledge new learning, and Able to retain information      Endings: None Reported    Modes of Intervention: Education, Support, Socialization, Exploration, Clarifying, and Problem-solving      Discipline Responsible: /Counselor      Signature:  ELI Espinal LSW

## 2024-12-30 LAB
EKG ATRIAL RATE: 71 BPM
EKG P AXIS: 63 DEGREES
EKG P-R INTERVAL: 176 MS
EKG Q-T INTERVAL: 366 MS
EKG QRS DURATION: 98 MS
EKG QTC CALCULATION (BAZETT): 397 MS
EKG R AXIS: 83 DEGREES
EKG T AXIS: 62 DEGREES
EKG VENTRICULAR RATE: 71 BPM

## 2024-12-30 PROCEDURE — 1240000000 HC EMOTIONAL WELLNESS R&B

## 2024-12-30 PROCEDURE — 6370000000 HC RX 637 (ALT 250 FOR IP): Performed by: PSYCHIATRY & NEUROLOGY

## 2024-12-30 PROCEDURE — 6370000000 HC RX 637 (ALT 250 FOR IP)

## 2024-12-30 PROCEDURE — 99232 SBSQ HOSP IP/OBS MODERATE 35: CPT

## 2024-12-30 PROCEDURE — 93010 ELECTROCARDIOGRAM REPORT: CPT | Performed by: INTERNAL MEDICINE

## 2024-12-30 RX ORDER — RISPERIDONE 1 MG/1
1 TABLET ORAL DAILY
Status: DISCONTINUED | OUTPATIENT
Start: 2024-12-31 | End: 2025-01-01

## 2024-12-30 RX ORDER — RISPERIDONE 2 MG/1
2 TABLET ORAL NIGHTLY
Status: DISCONTINUED | OUTPATIENT
Start: 2024-12-30 | End: 2025-01-01

## 2024-12-30 RX ADMIN — DIVALPROEX SODIUM 250 MG: 250 TABLET, DELAYED RELEASE ORAL at 09:16

## 2024-12-30 RX ADMIN — Medication 3 MG: at 20:50

## 2024-12-30 RX ADMIN — RISPERIDONE 2 MG: 2 TABLET, FILM COATED ORAL at 20:50

## 2024-12-30 RX ADMIN — RISPERIDONE 1 MG: 1 TABLET, FILM COATED ORAL at 09:16

## 2024-12-30 RX ADMIN — DIVALPROEX SODIUM 250 MG: 250 TABLET, DELAYED RELEASE ORAL at 20:50

## 2024-12-30 RX ADMIN — BENZTROPINE MESYLATE 2 MG: 2 TABLET ORAL at 20:50

## 2024-12-30 ASSESSMENT — PAIN SCALES - GENERAL: PAINLEVEL_OUTOF10: 0

## 2024-12-30 NOTE — PLAN OF CARE
Patient denies suicidal ideation, homicidal ideation, auditory/visual hallucinations. Teresa anxiety and depression. Denies racing thoughts. No paranoia or delusions reported or observed. Appears flat, anxious, guarded, and cooperative during assessment. Reports appetite and sleep are good. Patient is taking medications without issues and is not attending groups. Remains in control of behaviors.        Problem: Anxiety  Goal: Will report anxiety at manageable levels  Description: INTERVENTIONS:  1. Administer medication as ordered  2. Teach and rehearse alternative coping skills  3. Provide emotional support with 1:1 interaction with staff  Outcome: Progressing     Problem: Decision Making  Goal: Pt/Family able to effectively weigh alternatives and participate in decision making related to treatment and care  Description: INTERVENTIONS:  1. Determine when there are differences between patient's view, family's view, and healthcare provider's view of condition  2. Facilitate patient and family articulation of goals for care  3. Help patient and family identify pros/cons of alternative solutions  4. Provide information as requested by patient/family  5. Respect patient/family right to receive or not to receive information  6. Serve as a liaison between patient and family and health care team  7. Initiate Consults from Ethics, Palliative Care or initiate Family Care Conference as is appropriate  12/30/2024 0920 by Claudia Cardenas RN  Outcome: Progressing     Problem: Confusion  Goal: Confusion, delirium, dementia, or psychosis is improved or at baseline  Description: INTERVENTIONS:  1. Assess for possible contributors to thought disturbance, including medications, impaired vision or hearing, underlying metabolic abnormalities, dehydration, psychiatric diagnoses, and notify attending LIP  2. [place] high risk fall precautions, as indicated  3. Provide frequent short contacts to provide reality reorientation,  initiate Suicide Precautions, move to a room close to the nursing station and obtain sitter  5. Initiate consults as appropriate with Mental Health Professional, Spiritual Care, Psychosocial CNS, and consider a recommendation to the LIP for a Psychiatric Consultation  12/30/2024 0920 by Claudia Cardenas, RN  Outcome: Progressing     Problem: Risk for Elopement  Goal: Patient will not exit the unit/facility without proper excort  12/30/2024 0920 by Claudia Cardenas RN  Outcome: Progressing     Problem: Pain  Goal: Verbalizes/displays adequate comfort level or baseline comfort level  12/30/2024 0920 by Cluadia Cardenas, RN  Outcome: Progressing

## 2024-12-30 NOTE — GROUP NOTE
Group Therapy Note    Date: 12/30/2024    Group Start Time: 1045  Group End Time: 1130  Group Topic: Psychoeducation    [facility]    Sara Conrad, CTRS    Date: 12/30/2024  Module Name:  cognitive distortions     Patient's Goal:  pt will be able to id different types of distortions and what one can do to make positive choices.     Notes:  pleasant and engaged in group, able to share when prompted, and accepting of handout.     Status After Intervention:  Improved    Participation Level: Active Listener and Interactive    Participation Quality: Appropriate, Attentive, and Sharing      Speech:  normal      Thought Process/Content: Logical      Affective Functioning: Congruent      Mood: euthymic      Level of consciousness:  Alert, Oriented x4, and Attentive      Response to Learning: Able to verbalize/acknowledge new learning, Able to retain information, and Progressing to goal      Endings: None Reported    Modes of Intervention: Education, Support, and Socialization      Discipline Responsible: Psychoeducational Specialist      Signature:  Sara Conrad CTRS

## 2024-12-30 NOTE — PLAN OF CARE
Problem: Anxiety  Goal: Will report anxiety at manageable levels  Description: INTERVENTIONS:  1. Administer medication as ordered  2. Teach and rehearse alternative coping skills  3. Provide emotional support with 1:1 interaction with staff  12/30/2024 0115 by Maria C Avial RN  Outcome: Not Progressing  12/29/2024 1131 by Elisha Correa RN  Outcome: Progressing     Problem: Coping  Goal: Pt/Family able to verbalize concerns and demonstrate effective coping strategies  Description: INTERVENTIONS:  1. Assist patient/family to identify coping skills, available support systems and cultural and spiritual values  2. Provide emotional support, including active listening and acknowledgement of concerns of patient and caregivers  3. Reduce environmental stimuli, as able  4. Instruct patient/family in relaxation techniques, as appropriate  5. Assess for spiritual pain/suffering and initiate Spiritual Care, Psychosocial Clinical Specialist consults as needed  12/30/2024 0115 by Maria C Avila RN  Outcome: Not Progressing  12/29/2024 1131 by Elisha Correa RN  Outcome: Progressing     Problem: Behavior  Goal: Pt/Family maintain appropriate behavior and adhere to behavioral management agreement, if implemented  Description: INTERVENTIONS:  1. Assess patient/family's coping skills and  non-compliant behavior (including use of illegal substances)  2. Notify security of behavior or suspected illegal substances which indicate the need for search of the family and/or belongings  3. Encourage verbalization of thoughts and concerns in a socially appropriate manner  4. Utilize positive, consistent limit setting strategies supporting safety of patient, staff and others  5. Encourage participation in the decision making process about the behavioral management agreement  6. If a visitor's behavior poses a threat to safety call refer to organization policy.  7. Initiate consult with , Psychosocial CNS, Spiritual  Care as appropriate  12/30/2024 0115 by Maria C Avila, RN  Outcome: Not Progressing  12/29/2024 1131 by Elisha Correa RN  Outcome: Progressing     Problem: Depression/Self Harm  Goal: Effect of psychiatric condition will be minimized and patient will be protected from self harm  Description: INTERVENTIONS:  1. Assess impact of patient's symptoms on level of functioning, self care needs and offer support as indicated  2. Assess patient/family knowledge of depression, impact on illness and need for teaching  3. Provide emotional support, presence and reassurance  4. Assess for possible suicidal thoughts or ideation. If patient expresses suicidal thoughts or statements do not leave alone, initiate Suicide Precautions, move to a room close to the nursing station and obtain sitter  5. Initiate consults as appropriate with Mental Health Professional, Spiritual Care, Psychosocial CNS, and consider a recommendation to the [facility] for a Psychiatric Consultation  12/30/2024 0115 by Maria C Avila, RN  Outcome: Not Progressing  12/29/2024 1131 by Elisha Correa, RN  Outcome: Progressing

## 2024-12-30 NOTE — GROUP NOTE
Group Therapy Note    Date: 12/30/2024    Group Start Time: 1500  Group End Time: 1545  Group Topic: Recovery    [facility]    Sara Conrad CTRS; Armando Sanchez        Group Therapy Note    Type of Group: Recovery    Group Topic: Peer Recovery and Local Resources     Patient's Goal:  Pt will be able to id local resources as in iop/php, AA/NA, 211 and so on.     Notes:  appeared to be an active listener in group. Asking questions and able to give feedback.     Status After Intervention:  Improved    Participation Level: Active Listener    Participation Quality: Appropriate, Attentive, and Sharing      Speech:  normal      Thought Process/Content: Logical      Affective Functioning: Congruent      Mood: euthymic      Level of consciousness:  Alert, Oriented x4, and Attentive      Response to Learning: Able to verbalize/acknowledge new learning and Able to retain information      Endings: None Reported    Modes of Intervention: Education, Support, and Socialization      Discipline Responsible: Psychoeducational Specialist      Signature:  CARISA Joe

## 2024-12-30 NOTE — CARE COORDINATION
RACHAEL contacted pt's mother Lorraine [phone] (SUSANA signed) to gain collateral. No answer, a voicemail was left.

## 2024-12-30 NOTE — PROGRESS NOTES
BEHAVIORAL HEALTH FOLLOW-UP NOTE     12/30/2024     Patient was seen and examined in person, Chart reviewed   Patient's case discussed with staff/team    Chief Complaint: Auditory and visual hallucinations    Interim History:   Patient was seen in his room he is lying in bed he is currently calm, does remain flat blunted, guarded.  He denies suicidal and homicidal ideation.  He denies visual hallucinations when asked about auditory hallucinations he states \"I hear my thoughts\".  He does appear internally preoccupied at times.  He has been taking his medication, he said no behavioral disturbances on the unit, he is isolative to his room per staff did encourage patient to spend more time out on the unit and attending groups.  He has been taking his medication, he said no behavioral disturbances on the unit.  Sleep and appetite reported to be fair    Appetite: [x] Normal/Unchanged  [] Increased  [] Decreased      Sleep:       [x] Normal/Unchanged  [] Fair       [] Poor              Energy:    [x] Normal/Unchanged  [] Increased  [] Decreased        SI [] Present  [x] Absent    HI  []Present  [x] Absent     Aggression:  [] yes  [x] no    Patient is [x] able  [] unable to CONTRACT FOR SAFETY     PAST MEDICAL/PSYCHIATRIC HISTORY:   Past Medical History:   Diagnosis Date    ADD (attention deficit disorder)     Bipolar 1 disorder (HCC)        FAMILY/SOCIAL HISTORY:  History reviewed. No pertinent family history.  Social History     Socioeconomic History    Marital status: Single     Spouse name: Not on file    Number of children: Not on file    Years of education: Not on file    Highest education level: Not on file   Occupational History    Occupation:    Tobacco Use    Smoking status: Every Day     Current packs/day: 0.50     Average packs/day: 0.5 packs/day for 8.0 years (4.0 ttl pk-yrs)     Types: Cigarettes    Smokeless tobacco: Never    Tobacco comments:     Smokes 8-9 cigarettes / day   Vaping Use    Vaping  Pam HERNANDEZ MD    nicotine (NICODERM CQ) 21 MG/24HR 1 patch, 1 patch, TransDERmal, Daily, Pam Guzmán MD    aluminum & magnesium hydroxide-simethicone (MAALOX) 200-200-20 MG/5ML suspension 30 mL, 30 mL, Oral, PRN, Pam Guzmán MD    hydrOXYzine pamoate (VISTARIL) capsule 25 mg, 25 mg, Oral, TID PRN, Pam Guzmán MD, 25 mg at 12/29/24 2228    haloperidol (HALDOL) tablet 3 mg, 3 mg, Oral, Q6H PRN **OR** haloperidol lactate (HALDOL) injection 3 mg, 3 mg, IntraMUSCular, Q6H PRN, Pam Guzmán MD    melatonin tablet 3 mg, 3 mg, Oral, Nightly PRN, Pam Guzmán MD, 3 mg at 12/29/24 2227    influenza split vaccine (PF) (AFLURIA;FLUARIX) injection 0.5 mL, 1 Dose, IntraMUSCular, Prior to discharge, Teresa Hernandez APRN - CNP    benztropine (COGENTIN) tablet 2 mg, 2 mg, Oral, Nightly, Teresa Hernandez APRN - CNP, 2 mg at 12/29/24 2227    divalproex (DEPAKOTE) DR tablet 250 mg, 250 mg, Oral, 2 times per day, Teresa Hernandez APRN - CNP, 250 mg at 12/30/24 0916      Examination:  BP (!) 100/55   Pulse 74   Temp 97.1 °F (36.2 °C) (Infrared)   Resp 16   Ht 1.88 m (6' 2\")   Wt 77.1 kg (170 lb)   SpO2 97%   BMI 21.83 kg/m²   Gait - steady  Medication side effects(SE):     Mental Status Examination:    Level of consciousness:  within normal limits   Appearance:  fair grooming and fair hygiene  Behavior/Motor:  no abnormalities noted  Attitude toward examiner:  cooperative  Speech:  spontaneous, normal rate, and normal volume   Mood: constricted  Affect:  blunted and flat  Thought processes:  linear   Thought content: Currently denies auditory visual hallucinations does appear preoccupied internally stimulated at, delusions or any perceptual normalities.  Denies suicidal ideation intent or plan.  Denies homicidal ideation intent or plan  Language:intact  Remote Memory:intact  Recent Memory:intact  Cognition:  oriented to person, place, and time   Fund of Knowledge: Adequate  Attention:

## 2024-12-30 NOTE — GROUP NOTE
Shared goal for the day as to write today and read a book.                                                                         Group Therapy Note    Date: 12/30/2024    Group Start Time: 0840  Group End Time: 0900  Group Topic: Community Meeting    [facility]    Sara Conrad CTRS    Type of Group: Community Meeting      Patient's Goal:  Patient will be able to id staffing assignments, expectations of patients, and general information re: floor rules. Will be prompted to share goal for the day.     Notes:  Patient appeared to be an active listener, taking in information presented and was prompted to share goal for the day.    Status After Intervention:  Improved    Participation Level: Active Listener and Interactive    Participation Quality: Appropriate, Attentive, and Sharing      Speech:  normal      Thought Process/Content: Logical      Affective Functioning: Congruent      Mood: euthymic      Level of consciousness:  Alert, Oriented x4, and Attentive      Response to Learning: Able to verbalize/acknowledge new learning and Able to retain information      Endings: None Reported    Modes of Intervention: Support and Socialization      Discipline Responsible: Psychoeducational Specialist      Signature:  CARISA Joe

## 2024-12-30 NOTE — BH NOTE
Patient was isolative to his room. Alert and oriented x 4.  Eye contact is poor. Patient denies suicidal,homicidal or visual hallucinations. But states that he believes he hears his thoughts. Is observed anxious and suspicious of this nurse. Verbalizes that depression 5/10 do to life throws a fast ball  (sometimes I do good and it turns on me) Patient does verbalize paranoid of people. States that since admission that his sleep and depression has improved. Attended group. No behaviors observed or reported. Compliant with evening medications. Purposeful rounds continue.

## 2024-12-31 PROCEDURE — 99232 SBSQ HOSP IP/OBS MODERATE 35: CPT

## 2024-12-31 PROCEDURE — 1240000000 HC EMOTIONAL WELLNESS R&B

## 2024-12-31 PROCEDURE — 6370000000 HC RX 637 (ALT 250 FOR IP)

## 2024-12-31 PROCEDURE — 6370000000 HC RX 637 (ALT 250 FOR IP): Performed by: PSYCHIATRY & NEUROLOGY

## 2024-12-31 RX ADMIN — RISPERIDONE 1 MG: 1 TABLET, FILM COATED ORAL at 08:44

## 2024-12-31 RX ADMIN — Medication 3 MG: at 21:42

## 2024-12-31 RX ADMIN — DIVALPROEX SODIUM 250 MG: 250 TABLET, DELAYED RELEASE ORAL at 21:41

## 2024-12-31 RX ADMIN — HYDROXYZINE PAMOATE 25 MG: 25 CAPSULE ORAL at 21:42

## 2024-12-31 RX ADMIN — DIVALPROEX SODIUM 250 MG: 250 TABLET, DELAYED RELEASE ORAL at 08:44

## 2024-12-31 RX ADMIN — RISPERIDONE 2 MG: 2 TABLET, FILM COATED ORAL at 21:41

## 2024-12-31 RX ADMIN — BENZTROPINE MESYLATE 2 MG: 2 TABLET ORAL at 21:41

## 2024-12-31 ASSESSMENT — PAIN SCALES - GENERAL
PAINLEVEL_OUTOF10: 0

## 2024-12-31 NOTE — PLAN OF CARE
Problem: Anxiety  Goal: Will report anxiety at manageable levels  Description: INTERVENTIONS:  1. Administer medication as ordered  2. Teach and rehearse alternative coping skills  3. Provide emotional support with 1:1 interaction with staff  12/30/2024 2023 by Emeterio Love, RN  Outcome: Progressing     Problem: Depression/Self Harm  Goal: Effect of psychiatric condition will be minimized and patient will be protected from self harm  Description: INTERVENTIONS:  1. Assess impact of patient's symptoms on level of functioning, self care needs and offer support as indicated  2. Assess patient/family knowledge of depression, impact on illness and need for teaching  3. Provide emotional support, presence and reassurance  4. Assess for possible suicidal thoughts or ideation. If patient expresses suicidal thoughts or statements do not leave alone, initiate Suicide Precautions, move to a room close to the nursing station and obtain sitter  5. Initiate consults as appropriate with Mental Health Professional, Spiritual Care, Psychosocial CNS, and consider a recommendation to the [facility] for a Psychiatric Consultation  12/30/2024 2023 by Emeterio Love RN  Outcome: Progressing  Flowsheets (Taken 12/30/2024 2023)  Effect of psychiatric condition will be minimized and patient will be protected from self harm:   Assess impact of patient’s symptoms on level of functioning, self care needs and offer support as indicated   Provide emotional support, presence and reassurance     Problem: Risk for Elopement  Goal: Patient will not exit the unit/facility without proper excort  12/30/2024 2023 by Emeterio Love, RN  Outcome: Progressing      Patient denies suicidal/homicidal ideations at this time.  Patient denies auditory/visual hallucinations at this time.  Patient denies anxiety and depression levels at this time.  Patient assessed in room, makes fair eye contact, blunted responses somewhat guarded. Patient appears to be thought  blocking, and internally stimulated.  Patient is calm, cooperative, in control of behaviors at this time.  No acute behavioral concerns voiced/noted at this time.

## 2024-12-31 NOTE — CARE COORDINATION
RACHAEL contacted pt's mother Lorraine [phone] (SUSANA signed) to gain collateral. No answer, a voicemail was left.

## 2024-12-31 NOTE — GROUP NOTE
Date: 12/31/2024  Start Time: 1000  End Time:  1050  Number of Participants: 12    Type of Group: Psychoeducation    Wellness Binder Information  Module Name:  Coping Skills    Patient's Goal:  To increase knowledge of coping skills one can utilize to improve overall mood and decrease crisis situations.      Notes:  CTRS facilitated educational discussion on coping skills and different types of coping mechanisms.  Patient was able to ID at least one coping mechanism they could utilize to improve overall mood.  Patient was an active participant in discussion and was accepting of handout.      Status After Intervention:  Improved    Participation Level: Active Listener and Interactive    Participation Quality: Appropriate and Attentive      Speech:  normal      Thought Process/Content: Logical      Affective Functioning: Congruent      Mood: euthymic      Level of consciousness:  Alert and Attentive      Response to Learning: Able to verbalize current knowledge/experience, Able to verbalize/acknowledge new learning, and Progressing to goal      Endings: None Reported    Modes of Intervention: Education, Exploration, Clarifying, and Problem-solving      Discipline Responsible: Recreational Therapist      Signature:  CARISA Zapien

## 2024-12-31 NOTE — CARE COORDINATION
SW contacted pt's mother Lorraine [phone] (SUSANA signed). Lorraine reports the pt will do good but then he has these episodes or a setback. She was giving him his medications but then she trusted him to take his medications. She found out the day he came to the hospital that he had thrown his medications away.     Lorraine stated last month the pt woke up everyday and asked if she needed anything from the store. One day she heard him saying he is tired of being asked to go to the store so she told him he doesn't have to go unless he wants to go. However his sister then offered to go to the store for him but he said he would rather go himself.     Lorraine stated last year they had to go to court because some lady said the pt was harassing her and had sent her a letter with porn in it. The pt has reportedly not bothered this lady since. However, he likes to try to send stuff in the mail and she found a letter that was addressed to a female that was talking about the government. She tried to tell him he cannot send stuff like that in the mail but when she tells him this he thinks she is controlling him. Lorraine stated every other month the pt tries to change his insurance.     Lorraine stated she can tell when the pt is going through something when he shelters himself in his room and doesn't care for himself. It is not normal for the pt to not shower or change his clothes. Lorraine stated the pt wants to move out. The pt worked for [employer] for 7 years but then he randomly quit. He then got a job for [employer] and he told her they had gone on strike but she later learned he was let go from the job.     Lorraine confirmed the pt will return home at time of discharge. He does not have access to any guns or weapons and she has already hidden the knives and his swords. Lorraine has been talking with the pt daily and he still \"sounds weird.\" She stated the pt does not sound like himself and he is usually more talkative. Lorraine would like to be

## 2024-12-31 NOTE — GROUP NOTE
Group Therapy Note    Date: 12/31/2024  Start Time: 0745  End Time:  0805  Number of Participants: 9    Type of Group: Community Meeting    Wellness Binder Information  Module Name:  Community Meeting      Patient's Goal:  Patient will be oriented to the unit including but not limited expectations, staff, programming schedule.  Patient will be able to ID expectations of treatment.     Notes: Patient was a participant in community meeting, and was updated on expectations of the unit, staffing, programming schedule, and acclimated to their environment.    Patient shared goal for today as \"try to stay healthy\"    Status After Intervention:  Improved    Participation Level: Active Listener and Interactive    Participation Quality: Appropriate and Attentive      Speech:  normal      Thought Process/Content: Logical      Affective Functioning: Congruent      Mood: euthymic      Level of consciousness:  Alert and Attentive      Response to Learning: Able to verbalize current knowledge/experience, Able to verbalize/acknowledge new learning, and Progressing to goal      Endings: None Reported    Modes of Intervention: Exploration, Clarifying, and Problem-solving      Discipline Responsible: Recreational Therapist      Signature:  CARISA Zapien

## 2024-12-31 NOTE — GROUP NOTE
Group Therapy Note    Date: 12/31/2024    Group Start Time: 1455  Group End Time: 1525  Group Topic: Recreational    [facility]    Dayanara Rodrigues CTRS    Date: 12/31/2024  Start Time: 1455  End Time:  1525  Number of Participants: 5    Type of Group: Recreational    Wellness Binder Information  Module Name:  New Years Nuzhat Superstition    Patient's Goal:  to improve socialization amongst peers.  To improve overall mood    Notes:  CTRS facilitated new years nuzhat superstitions.  Patient was an active participant in activity and exhibited an overall improved behavior.      Status After Intervention:  Improved    Participation Level: Active Listener and Interactive    Participation Quality: Appropriate and Attentive      Speech:  normal      Thought Process/Content: Logical      Affective Functioning: Congruent      Mood: euthymic      Level of consciousness:  Alert and Attentive      Response to Learning: Able to verbalize current knowledge/experience, Able to verbalize/acknowledge new learning, and Progressing to goal      Endings: None Reported    Modes of Intervention: Socialization and Activity      Discipline Responsible: Recreational Therapist      Signature:  CARISA Zapien

## 2024-12-31 NOTE — PROGRESS NOTES
BEHAVIORAL HEALTH FOLLOW-UP NOTE     12/31/2024     Patient was seen and examined in person, Chart reviewed   Patient's case discussed with staff/team    Chief Complaint: Auditory and visual hallucinations    Interim History:   Patient was seen in his room he is lying in bed he is currently calm, does remain flat blunted, guarded, endorses some thought blocking.  He states that he is beginning to feel better he denies auditory visual hallucinations although appears internally preoccupied at times.  He states he feels like his thoughts are getting clear.  He does endorse thought blocking.  He denies suicidal and homicidal ideation intent or plan.  He has been taking his medication, he said no behavioral disturbances on the unit, he is isolative to his room for a lot of the day however he is reportedly attending groups.  Sleep and appetite reported to be fair.      Appetite: [x] Normal/Unchanged  [] Increased  [] Decreased      Sleep:       [x] Normal/Unchanged  [] Fair       [] Poor              Energy:    [x] Normal/Unchanged  [] Increased  [] Decreased        SI [] Present  [x] Absent    HI  []Present  [x] Absent     Aggression:  [] yes  [x] no    Patient is [x] able  [] unable to CONTRACT FOR SAFETY     PAST MEDICAL/PSYCHIATRIC HISTORY:   Past Medical History:   Diagnosis Date    ADD (attention deficit disorder)     Bipolar 1 disorder (HCC)        FAMILY/SOCIAL HISTORY:  History reviewed. No pertinent family history.  Social History     Socioeconomic History    Marital status: Single     Spouse name: Not on file    Number of children: Not on file    Years of education: Not on file    Highest education level: Not on file   Occupational History    Occupation:    Tobacco Use    Smoking status: Every Day     Current packs/day: 0.50     Average packs/day: 0.5 packs/day for 8.0 years (4.0 ttl pk-yrs)     Types: Cigarettes    Smokeless tobacco: Never    Tobacco comments:     Smokes 8-9 cigarettes / day   Vaping  stay: 3 to 7 days based on stability        NOTE: This report was transcribed using voice recognition software. Every effort was made to ensure accuracy; however, inadvertent computerized transcription errors may be present.     Electronically signed by OC Song CNP on 12/31/2024 at 9:50 AM

## 2024-12-31 NOTE — PLAN OF CARE
Problem: Anxiety  Goal: Will report anxiety at manageable levels  Description: INTERVENTIONS:  1. Administer medication as ordered  2. Teach and rehearse alternative coping skills  3. Provide emotional support with 1:1 interaction with staff  12/31/2024 0905 by Pallavi Atwood RN  Flowsheets (Taken 12/31/2024 0905)  Will report anxiety at manageable levels:   Administer medication as ordered   Provide emotional support with 1:1 interaction with staff   Teach and rehearse alternative coping skills     Problem: Coping  Goal: Pt/Family able to verbalize concerns and demonstrate effective coping strategies  Description: INTERVENTIONS:  1. Assist patient/family to identify coping skills, available support systems and cultural and spiritual values  2. Provide emotional support, including active listening and acknowledgement of concerns of patient and caregivers  3. Reduce environmental stimuli, as able  4. Instruct patient/family in relaxation techniques, as appropriate  5. Assess for spiritual pain/suffering and initiate Spiritual Care, Psychosocial Clinical Specialist consults as needed  Outcome: Progressing  Flowsheets (Taken 12/31/2024 0905)  Patient/family able to verbalize anxieties, fears, and concerns, and demonstrate effective coping:   Assist patient/family to identify coping skills, available support systems and cultural and spiritual values   Provide emotional support, including active listening and acknowledgement of concerns of patient and caregivers      Patient denies SI/HI/Hallucinations. Patient is medication compliant and in control of his behaviors. Patient is in no active distress respirations even and unlabored. Will continue to monitor and will intervene as needed with close 15 minute rounds.

## 2025-01-01 LAB
DATE LAST DOSE: ABNORMAL
TME LAST DOSE: ABNORMAL H
VALPROATE SERPL-MCNC: 47 UG/ML (ref 50–100)
VANCOMYCIN DOSE: ABNORMAL MG

## 2025-01-01 PROCEDURE — 36415 COLL VENOUS BLD VENIPUNCTURE: CPT

## 2025-01-01 PROCEDURE — 6370000000 HC RX 637 (ALT 250 FOR IP)

## 2025-01-01 PROCEDURE — 1240000000 HC EMOTIONAL WELLNESS R&B

## 2025-01-01 PROCEDURE — 6370000000 HC RX 637 (ALT 250 FOR IP): Performed by: PSYCHIATRY & NEUROLOGY

## 2025-01-01 PROCEDURE — 99232 SBSQ HOSP IP/OBS MODERATE 35: CPT

## 2025-01-01 PROCEDURE — 80164 ASSAY DIPROPYLACETIC ACD TOT: CPT

## 2025-01-01 RX ORDER — RISPERIDONE 2 MG/1
2 TABLET ORAL 2 TIMES DAILY
Status: COMPLETED | OUTPATIENT
Start: 2025-01-01 | End: 2025-01-02

## 2025-01-01 RX ADMIN — BENZTROPINE MESYLATE 2 MG: 2 TABLET ORAL at 20:51

## 2025-01-01 RX ADMIN — Medication 3 MG: at 20:51

## 2025-01-01 RX ADMIN — HYDROXYZINE PAMOATE 25 MG: 25 CAPSULE ORAL at 20:51

## 2025-01-01 RX ADMIN — DIVALPROEX SODIUM 250 MG: 250 TABLET, DELAYED RELEASE ORAL at 20:51

## 2025-01-01 RX ADMIN — DIVALPROEX SODIUM 250 MG: 250 TABLET, DELAYED RELEASE ORAL at 08:23

## 2025-01-01 RX ADMIN — RISPERIDONE 1 MG: 1 TABLET, FILM COATED ORAL at 08:23

## 2025-01-01 RX ADMIN — RISPERIDONE 2 MG: 2 TABLET, FILM COATED ORAL at 20:51

## 2025-01-01 NOTE — PLAN OF CARE
Patient denies suicidal/homicidal ideations and hallucinations. Patient denies anxiety and depression. Patient appears thoughtful and states he is starting to feel better. Patient is taking ordered medications without issue. Continued support offered to patient. Safety rounds continue.     Problem: Anxiety  Goal: Will report anxiety at manageable levels  Description: INTERVENTIONS:  1. Administer medication as ordered  2. Teach and rehearse alternative coping skills  3. Provide emotional support with 1:1 interaction with staff  12/31/2024 2056 by Alisa Mosqueda RN  Outcome: Progressing     Problem: Coping  Goal: Pt/Family able to verbalize concerns and demonstrate effective coping strategies  Description: INTERVENTIONS:  1. Assist patient/family to identify coping skills, available support systems and cultural and spiritual values  2. Provide emotional support, including active listening and acknowledgement of concerns of patient and caregivers  3. Reduce environmental stimuli, as able  4. Instruct patient/family in relaxation techniques, as appropriate  5. Assess for spiritual pain/suffering and initiate Spiritual Care, Psychosocial Clinical Specialist consults as needed  12/31/2024 2056 by Alisa Mosqueda RN  Outcome: Progressing     Problem: Confusion  Goal: Confusion, delirium, dementia, or psychosis is improved or at baseline  Description: INTERVENTIONS:  1. Assess for possible contributors to thought disturbance, including medications, impaired vision or hearing, underlying metabolic abnormalities, dehydration, psychiatric diagnoses, and notify attending LIP  2. [place] high risk fall precautions, as indicated  3. Provide frequent short contacts to provide reality reorientation, refocusing and direction  4. Decrease environmental stimuli, including noise as appropriate  5. Monitor and intervene to maintain adequate nutrition, hydration, elimination, sleep and activity  6. If unable to ensure safety

## 2025-01-01 NOTE — PLAN OF CARE
Problem: Anxiety  Goal: Will report anxiety at manageable levels  Description: INTERVENTIONS:  1. Administer medication as ordered  2. Teach and rehearse alternative coping skills  3. Provide emotional support with 1:1 interaction with staff  1/1/2025 0917 by Pallavi Atwood, RN  Outcome: Progressing     Problem: Coping  Goal: Pt/Family able to verbalize concerns and demonstrate effective coping strategies  Description: INTERVENTIONS:  1. Assist patient/family to identify coping skills, available support systems and cultural and spiritual values  2. Provide emotional support, including active listening and acknowledgement of concerns of patient and caregivers  3. Reduce environmental stimuli, as able  4. Instruct patient/family in relaxation techniques, as appropriate  5. Assess for spiritual pain/suffering and initiate Spiritual Care, Psychosocial Clinical Specialist consults as needed  1/1/2025 0917 by Pallavi Atwood, RN  Outcome: Progressing     Problem: Death & Dying  Goal: Pt/Family communicate acceptance of impending death and feel psychological comfort and peace  Description: INTERVENTIONS:  1. Assess patient/family anxiety and grief process related to end of life issues  2. Provide emotional and spiritual support  3. Provide information about the patient's health status with consideration of family and cultural values  4. Communicate willingness to discuss death and facilitate grief process  with patient/family as appropriate  5. Emphasize sustaining relationships within family system and community, or kayla/spiritual traditions  6. Initiate Spiritual Care, Psychosocial Clinical Specialist, consult as needed  Outcome: Progressing      Patient denies SI/HI/Hallucinations. Patient is medication compliant. Patient denies anxiety and depression at this time. No active distress is noted, respirations even and unlabored. Will continue to monitor and will intervene as needed with close 15 minute rounds.

## 2025-01-01 NOTE — PROGRESS NOTES
BEHAVIORAL HEALTH FOLLOW-UP NOTE     1/1/2025     Patient was seen and examined in person, Chart reviewed   Patient's case discussed with staff/team    Chief Complaint: Auditory and visual hallucinations    Interim History:   Patient was seen sitting out on the unit finishing his breakfast he is currently calm, cooperative does remain flat and blunted however he does brighten with conversation.  He continues to endorse some thought blocking he denies auditory visual hallucinations although at times appears internally preoccupied.  He states that he feels medications are beginning to work and he is feeling better.  It was discussed with nursing staff that patient's mother reportedly stated that patient is still not at baseline.  He does deny suicidal and homicidal ideation.  He has been taking his medication, he said no behavioral disturbances on the unit, he is isolative to his room for a lot of the day however he is reportedly attending groups.  Sleep and appetite reported to be fair.      Appetite: [x] Normal/Unchanged  [] Increased  [] Decreased      Sleep:       [x] Normal/Unchanged  [] Fair       [] Poor              Energy:    [x] Normal/Unchanged  [] Increased  [] Decreased        SI [] Present  [x] Absent    HI  []Present  [x] Absent     Aggression:  [] yes  [x] no    Patient is [x] able  [] unable to CONTRACT FOR SAFETY     PAST MEDICAL/PSYCHIATRIC HISTORY:   Past Medical History:   Diagnosis Date    ADD (attention deficit disorder)     Bipolar 1 disorder (HCC)        FAMILY/SOCIAL HISTORY:  History reviewed. No pertinent family history.  Social History     Socioeconomic History    Marital status: Single     Spouse name: Not on file    Number of children: Not on file    Years of education: Not on file    Highest education level: Not on file   Occupational History    Occupation:    Tobacco Use    Smoking status: Every Day     Current packs/day: 0.50     Average packs/day: 0.5 packs/day for 8.0 years  continues to need, on a daily basis, active treatment furnished directly by or requiring the supervision of inpatient psychiatric personnel      Anticipated Length of stay: 3 to 7 days based on stability        NOTE: This report was transcribed using voice recognition software. Every effort was made to ensure accuracy; however, inadvertent computerized transcription errors may be present.     Electronically signed by OC Song CNP on 1/1/2025 at 9:10 AM

## 2025-01-02 PROCEDURE — 99232 SBSQ HOSP IP/OBS MODERATE 35: CPT

## 2025-01-02 PROCEDURE — 6370000000 HC RX 637 (ALT 250 FOR IP)

## 2025-01-02 PROCEDURE — 6370000000 HC RX 637 (ALT 250 FOR IP): Performed by: PSYCHIATRY & NEUROLOGY

## 2025-01-02 PROCEDURE — 1240000000 HC EMOTIONAL WELLNESS R&B

## 2025-01-02 RX ORDER — DIVALPROEX SODIUM 250 MG/1
250 TABLET, DELAYED RELEASE ORAL DAILY
Status: DISCONTINUED | OUTPATIENT
Start: 2025-01-03 | End: 2025-01-03 | Stop reason: HOSPADM

## 2025-01-02 RX ORDER — DIVALPROEX SODIUM 500 MG/1
500 TABLET, DELAYED RELEASE ORAL EVERY EVENING
Status: DISCONTINUED | OUTPATIENT
Start: 2025-01-02 | End: 2025-01-03 | Stop reason: HOSPADM

## 2025-01-02 RX ADMIN — DIVALPROEX SODIUM 250 MG: 250 TABLET, DELAYED RELEASE ORAL at 09:00

## 2025-01-02 RX ADMIN — RISPERIDONE 2 MG: 2 TABLET, FILM COATED ORAL at 20:40

## 2025-01-02 RX ADMIN — Medication 3 MG: at 20:40

## 2025-01-02 RX ADMIN — RISPERIDONE 2 MG: 2 TABLET, FILM COATED ORAL at 09:00

## 2025-01-02 RX ADMIN — HYDROXYZINE PAMOATE 25 MG: 25 CAPSULE ORAL at 20:40

## 2025-01-02 RX ADMIN — BENZTROPINE MESYLATE 2 MG: 2 TABLET ORAL at 20:40

## 2025-01-02 RX ADMIN — DIVALPROEX SODIUM 500 MG: 500 TABLET, DELAYED RELEASE ORAL at 17:05

## 2025-01-02 ASSESSMENT — PAIN SCALES - GENERAL
PAINLEVEL_OUTOF10: 0
PAINLEVEL_OUTOF10: 0

## 2025-01-02 NOTE — GROUP NOTE
Group Therapy Note    Date: 1/2/2025  Start Time: 0745  End Time:  0805  Number of Participants: 7    Type of Group: Community Meeting    Wellness Binder Information  Module Name:  Community Meeting      Patient's Goal:  Patient will be oriented to the unit including but not limited expectations, staff, programming schedule.  Patient will be able to ID expectations of treatment.     Notes: Patient was a participant in community meeting, and was updated on expectations of the unit, staffing, programming schedule, and acclimated to their environment.    Patient shared goal for today as \"Im not sure\"    Status After Intervention:  Improved    Participation Level: Active Listener    Participation Quality: Appropriate and Attentive      Speech:  reserved      Thought Process/Content: Logical      Affective Functioning: Flat      Mood: euthymic      Level of consciousness:  Alert and Attentive      Response to Learning: Able to verbalize current knowledge/experience and Able to verbalize/acknowledge new learning      Endings: None Reported    Modes of Intervention: Exploration, Clarifying, and Problem-solving      Discipline Responsible: Recreational Therapist      Signature:  CARISA Zapien

## 2025-01-02 NOTE — CARE COORDINATION
Melisa called pts mother Lorraine [phone] (SUSANA signed) for updated collateral. She stated that pt needs a couple more days. She stated that he is still isolative and staying in his room. She stated that he seems like he is not ready. She stated that she thinks he will be ready by Monday/Tuesday.

## 2025-01-02 NOTE — PLAN OF CARE
Patient denies suicidal/homicidal ideations and hallucinations. Patient denies anxiety and depression. Patient is taking ordered medications without issue. Continued support offered to patient. Safety rounds continue.     Problem: Anxiety  Goal: Will report anxiety at manageable levels  Description: INTERVENTIONS:  1. Administer medication as ordered  2. Teach and rehearse alternative coping skills  3. Provide emotional support with 1:1 interaction with staff  1/1/2025 2045 by Alisa Mosqueda RN  Outcome: Progressing     Problem: Coping  Goal: Pt/Family able to verbalize concerns and demonstrate effective coping strategies  Description: INTERVENTIONS:  1. Assist patient/family to identify coping skills, available support systems and cultural and spiritual values  2. Provide emotional support, including active listening and acknowledgement of concerns of patient and caregivers  3. Reduce environmental stimuli, as able  4. Instruct patient/family in relaxation techniques, as appropriate  5. Assess for spiritual pain/suffering and initiate Spiritual Care, Psychosocial Clinical Specialist consults as needed  1/1/2025 2045 by Alisa Mosqueda RN  Outcome: Progressing     Problem: Behavior  Goal: Pt/Family maintain appropriate behavior and adhere to behavioral management agreement, if implemented  Description: INTERVENTIONS:  1. Assess patient/family's coping skills and  non-compliant behavior (including use of illegal substances)  2. Notify security of behavior or suspected illegal substances which indicate the need for search of the family and/or belongings  3. Encourage verbalization of thoughts and concerns in a socially appropriate manner  4. Utilize positive, consistent limit setting strategies supporting safety of patient, staff and others  5. Encourage participation in the decision making process about the behavioral management agreement  6. If a visitor's behavior poses a threat to safety call refer to

## 2025-01-02 NOTE — PROGRESS NOTES
CQ) 21 MG/24HR 1 patch, 1 patch, TransDERmal, Daily, Pam Guzmán MD    aluminum & magnesium hydroxide-simethicone (MAALOX) 200-200-20 MG/5ML suspension 30 mL, 30 mL, Oral, PRN, Pam Guzmán MD    hydrOXYzine pamoate (VISTARIL) capsule 25 mg, 25 mg, Oral, TID PRN, Pam Guzmán MD, 25 mg at 01/01/25 2051    haloperidol (HALDOL) tablet 3 mg, 3 mg, Oral, Q6H PRN **OR** haloperidol lactate (HALDOL) injection 3 mg, 3 mg, IntraMUSCular, Q6H PRN, Pam Guzmán MD    melatonin tablet 3 mg, 3 mg, Oral, Nightly PRN, Pam Guzmán MD, 3 mg at 01/01/25 2051    influenza split vaccine (PF) (AFLURIA;FLUARIX) injection 0.5 mL, 1 Dose, IntraMUSCular, Prior to discharge, Teresa Hernandez APRN - CNP    benztropine (COGENTIN) tablet 2 mg, 2 mg, Oral, Nightly, Teresa Hernandez APRN - CNP, 2 mg at 01/01/25 2051    divalproex (DEPAKOTE) DR tablet 250 mg, 250 mg, Oral, 2 times per day, Teresa Hernandez APRN - CNP, 250 mg at 01/02/25 0900      Examination:  /65   Pulse 89   Temp 97.1 °F (36.2 °C) (Temporal)   Resp 16   Ht 1.88 m (6' 2\")   Wt 77.1 kg (170 lb)   SpO2 95%   BMI 21.83 kg/m²   Gait - steady  Medication side effects(SE):     Mental Status Examination:    Level of consciousness:  within normal limits   Appearance:  fair grooming and fair hygiene  Behavior/Motor:  no abnormalities noted  Attitude toward examiner:  cooperative  Speech:  spontaneous, normal rate, and normal volume   Mood: \"Okay   affect:  blunted and flat but brightens  Thought processes:  linear   Thought content: Currently denies auditory visual hallucinations does appear preoccupied internally stimulated at times, does not make any delusional paranoid statement. denies suicidal ideation intent or plan.  Denies homicidal ideation intent or plan  Language:intact  Remote Memory:intact  Recent Memory:intact  Cognition:  oriented to person, place, and time   Fund of Knowledge: Adequate  Attention: Adequate  Concentration  personnel      Anticipated Length of stay: 3 to 7 days based on stability        NOTE: This report was transcribed using voice recognition software. Every effort was made to ensure accuracy; however, inadvertent computerized transcription errors may be present.     Electronically signed by OC Song CNP on 1/2/2025 at 11:05 AM

## 2025-01-02 NOTE — GROUP NOTE
Group Therapy Note    Date: 1/2/2025  Start Time: 1000  End Time:  1100  Number of Participants: 7    Type of Group: Recovery    Wellness Binder Information  Module Name: Peer Recovery    Patient's Goal:  Patient will be able to demonstrate knowledge of community resources available for mental health and/or drug and alcohol.     Notes:  Peer  and musician, Devyn Zhu, provided patients with a story of hope and recovery.  Devyn incorporated music into his story, playing the guitar for the patients.  Patient listened attentively in group, and was receptive to the information provided.     Status After Intervention:  Improved    Participation Level: Active Listener and Interactive    Participation Quality: Appropriate and Attentive      Speech:  normal      Thought Process/Content: Logical      Affective Functioning: Congruent      Mood: euthymic      Level of consciousness:  Alert and Attentive      Response to Learning: Able to verbalize current knowledge/experience, Able to verbalize/acknowledge new learning, and Progressing to goal      Endings: None Reported    Modes of Intervention: Education, Support, Exploration, Clarifying, and Problem-solving      Discipline Responsible: Recreational Therapist      Signature:  CARISA Zapien

## 2025-01-02 NOTE — PLAN OF CARE
Patient denies suicidal ideation, homicidal ideation, and AV hallucinations. He is calm, cooperative, and friendly. He has a flat affect but does brighten on conversation. He did not sleep well last night due to his roommate. He is compliant with medications. Groups encouraged. He is in control of his behaviors at this time.     Problem: Anxiety  Goal: Will report anxiety at manageable levels  Description: INTERVENTIONS:  1. Administer medication as ordered  2. Teach and rehearse alternative coping skills  3. Provide emotional support with 1:1 interaction with staff  1/2/2025 0951 by Elisha Correa RN  Outcome: Progressing     Problem: Coping  Goal: Pt/Family able to verbalize concerns and demonstrate effective coping strategies  Description: INTERVENTIONS:  1. Assist patient/family to identify coping skills, available support systems and cultural and spiritual values  2. Provide emotional support, including active listening and acknowledgement of concerns of patient and caregivers  3. Reduce environmental stimuli, as able  4. Instruct patient/family in relaxation techniques, as appropriate  5. Assess for spiritual pain/suffering and initiate Spiritual Care, Psychosocial Clinical Specialist consults as needed  1/2/2025 0951 by Elisha Correa RN  Outcome: Progressing     Problem: Decision Making  Goal: Pt/Family able to effectively weigh alternatives and participate in decision making related to treatment and care  Description: INTERVENTIONS:  1. Determine when there are differences between patient's view, family's view, and healthcare provider's view of condition  2. Facilitate patient and family articulation of goals for care  3. Help patient and family identify pros/cons of alternative solutions  4. Provide information as requested by patient/family  5. Respect patient/family right to receive or not to receive information  6. Serve as a liaison between patient and family and health care team  7. Initiate  Care  Goal: Pt/Family able to move forward in process of forgiving self, others, and/or higher power  Description: INTERVENTIONS:  1. Assist patient/family to overcome blocks to healing by use of spiritual practices (prayer, meditation, guided imagery, reiki, breath work, etc).  2. De-myth guilt and help patient/family identify possible irrational spiritual/cultural beliefs and values.  3. Explore possibilities of making amends & reconciliation with self, others, and/or a greater power.  4. Guide patient/family in identifying painful feelings of guilt.  5. Help patient/famiy explore and identify spiritual beliefs, cultural understandings or values that may help or hinder letting go of issue.  6. Help patient/family explore feelings of anger, bitterness, resentment.  7. Help patient/family identify and examine the situation in which these feelings are experienced.  8. Help patient/family identify destructive displacement of feelings onto other individuals.  9. Invite use of sacraments/rituals/ceremonies as appropriate (e.g. - confession, anointing, smudging).  10. Refer patient/family to formal counseling and/or to kayla community for further support work.  Outcome: Progressing     Problem: Risk for Elopement  Goal: Patient will not exit the unit/facility without proper excort  Outcome: Progressing     Problem: Pain  Goal: Verbalizes/displays adequate comfort level or baseline comfort level  Outcome: Progressing

## 2025-01-02 NOTE — PROGRESS NOTES
Spiritual Support Group Note    Number of Participants in Group:                        Time: 3pm    Goal: Relief from isolation and loneliness             Maria C Sharing             Self-understanding and gain insight              Acceptance and belonging            Recognize they are not alone                Socialization             Empowerment       Encouragement    Topic:  [x] Spiritual Wellness and Self Care                  [] Hope                     [] Connecting with Divine/Others        [] Thankfulness and Gratitude               []  Meaningfulness and Purpose               [] Forgiveness               [] Peace               [] Connect to Community      [] Other    Participation Level:   [x] Active Listener   [] Minimal   [] Monopolizing   [] Interactive   [] No Participation   []  Other:     Attention:   [x] Alert   [] Distractible   [] Drowsy   [] Poor   [] Other:    [place]:   [x] Cooperative   [] Suspicious   [] Withdrawn   [] Guarded   [] Irritable   [] Inhospitable   [] Other:     Others Comments from Group:

## 2025-01-03 VITALS
OXYGEN SATURATION: 98 % | DIASTOLIC BLOOD PRESSURE: 58 MMHG | RESPIRATION RATE: 16 BRPM | WEIGHT: 170 LBS | BODY MASS INDEX: 21.82 KG/M2 | TEMPERATURE: 98.7 F | HEART RATE: 71 BPM | SYSTOLIC BLOOD PRESSURE: 101 MMHG | HEIGHT: 74 IN

## 2025-01-03 PROCEDURE — 99239 HOSP IP/OBS DSCHRG MGMT >30: CPT

## 2025-01-03 PROCEDURE — 90656 IIV3 VACC NO PRSV 0.5 ML IM: CPT

## 2025-01-03 PROCEDURE — 6360000002 HC RX W HCPCS

## 2025-01-03 PROCEDURE — 6370000000 HC RX 637 (ALT 250 FOR IP): Performed by: PSYCHIATRY & NEUROLOGY

## 2025-01-03 PROCEDURE — G0008 ADMIN INFLUENZA VIRUS VAC: HCPCS

## 2025-01-03 PROCEDURE — 6370000000 HC RX 637 (ALT 250 FOR IP)

## 2025-01-03 RX ORDER — BENZTROPINE MESYLATE 2 MG/1
2 TABLET ORAL NIGHTLY
Qty: 30 TABLET | Refills: 0 | Status: SHIPPED | OUTPATIENT
Start: 2025-01-03 | End: 2025-02-02

## 2025-01-03 RX ORDER — DIVALPROEX SODIUM 250 MG/1
250 TABLET, DELAYED RELEASE ORAL DAILY
Qty: 30 TABLET | Refills: 0 | Status: SHIPPED | OUTPATIENT
Start: 2025-01-04 | End: 2025-02-03

## 2025-01-03 RX ORDER — NICOTINE 21 MG/24HR
1 PATCH, TRANSDERMAL 24 HOURS TRANSDERMAL DAILY
COMMUNITY
Start: 2025-01-04

## 2025-01-03 RX ORDER — DIVALPROEX SODIUM 500 MG/1
500 TABLET, DELAYED RELEASE ORAL EVERY EVENING
Qty: 30 TABLET | Refills: 0 | Status: SHIPPED | OUTPATIENT
Start: 2025-01-03 | End: 2025-02-02

## 2025-01-03 RX ADMIN — INFLUENZA A VIRUS A/VICTORIA/4897/2022 IVR-238 (H1N1) ANTIGEN (PROPIOLACTONE INACTIVATED), INFLUENZA A VIRUS A/THAILAND/8/2022 IVR-237 (H3N2) ANTIGEN (PROPIOLACTONE INACTIVATED), INFLUENZA B VIRUS B/AUSTRIA/1359417/2021 BVR-26 ANTIGEN (PROPIOLACTONE INACTIVATED) 0.5 ML: 15; 15; 15 INJECTION, SUSPENSION INTRAMUSCULAR at 10:32

## 2025-01-03 RX ADMIN — DIVALPROEX SODIUM 250 MG: 250 TABLET, DELAYED RELEASE ORAL at 08:48

## 2025-01-03 NOTE — CARE COORDINATION
Discharge:    Sw met with pt to discuss discharge. Pt stated that he feels ready to go home. Pt denied SI/HI/AVH. He stated that he will be returning home and mom will find him a rid home.     Collateral:    Sw called pts mother Lorraine [phone] (SUSANA signed) to inform her of discharge. She had no concerns or questions. She stated that she will be setting up transportation for pt. Sw stated that we will have pt call when he is ready.     Appointments:    Pt has a mental health crisis appointment on 1/17 and a med appointment on 1/29.     In order to ensure appropriate transition and discharge planning is in place, the following documents have been transmitted to [facility] as the new outpatient provider:    The d/c diagnosis was transmitted to the next care provider  The reason for hospitalization was transmitted to the next care provider  The d/c medications (dosage and indication) were transmitted to the next care provider   The continuing care plan was transmitted to the next care provider

## 2025-01-03 NOTE — GROUP NOTE
Date: 1/3/2025  Start Time: 1400  End Time:  1435  Number of Participants: 5    Type of Group: Psychoeducation    Wellness Binder Information  Module Name:  Happiness Strategies    Patient's Goal:  Pt will be able to id steps to increasing ones own mental health thru positive mindset practices.     Notes:  CTRS facilitated educational discussion on tips to boost optimism and resiliency.  Patient was an active participant in discussion and was accepting of handout.  Patient was receptive to the information provided.     Status After Intervention:  Improved    Participation Level: Active Listener and Interactive    Participation Quality: Appropriate and Attentive      Speech:  normal      Thought Process/Content: Logical      Affective Functioning: congruent      Mood: euthymic      Level of consciousness:  Alert and Attentive      Response to Learning: Able to verbalize current knowledge/experience, Able to verbalize/acknowledge new learning, and Progressing to goal      Endings: None Reported    Modes of Intervention: Education, Exploration, Clarifying, and Problem-solving      Discipline Responsible: Recreational Therapist      Signature:  CARISA Zapien

## 2025-01-03 NOTE — CARE COORDINATION
RACHAEL contacted [facility] [phone] to schedule appointment for pt. They will call back shortly with appointment information.

## 2025-01-03 NOTE — GROUP NOTE
Shared goal for the day as to watch tv and read scripture.                                                                       Group Therapy Note    Date: 1/3/2025    Group Start Time: 0900  Group End Time: 0925  Group Topic: Psychoeducation    [facility]    Sara Conrad, CARISA    Type of Group: Community Meeting      Patient's Goal:  Patient will be able to id staffing assignments, expectations of patients, and general information re: floor rules. Will be prompted to share goal for the day.     Notes:  Patient appeared to be an active listener, taking in information presented and was prompted to share goal for the day.    Status After Intervention:  Improved    Participation Level: Active Listener and Interactive    Participation Quality: Appropriate, Attentive, and Sharing      Speech:  normal      Thought Process/Content: Logical      Affective Functioning: Congruent      Mood: euthymic      Level of consciousness:  Alert, Oriented x4, and Attentive      Response to Learning: Able to verbalize/acknowledge new learning, Able to retain information, and Progressing to goal      Endings: None Reported    Modes of Intervention: Support and Socialization      Discipline Responsible: Psychoeducational Specialist      Signature:  CARISA Joe

## 2025-01-03 NOTE — GROUP NOTE
Group Therapy Note    Date: 1/3/2025    Group Start Time: 0950  Group End Time: 1020  Group Topic: Cognitive Skills    [facility]    Merlene Jara LSW        Group Therapy Note    Attendees: 4       Patient's Goal:  Pts discussed music and recovery and how music an effect mood. Pts also identified a song based on their current feelings.     Notes:  Pt minimally participated in group.     Status After Intervention:  Unchanged    Participation Level: Minimal    Participation Quality: Appropriate      Speech:  normal      Thought Process/Content: Logical      Affective Functioning: Congruent      Mood: euthymic      Level of consciousness:  Alert      Response to Learning: Progressing to goal      Endings: None Reported    Modes of Intervention: Education, Support, Socialization, Exploration, Clarifying, and Problem-solving      Discipline Responsible: /Counselor      Signature:  KRUPA Gates

## 2025-01-03 NOTE — PLAN OF CARE
Problem: Anxiety  Goal: Will report anxiety at manageable levels  Description: INTERVENTIONS:  1. Administer medication as ordered  2. Teach and rehearse alternative coping skills  3. Provide emotional support with 1:1 interaction with staff  1/2/2025 2104 by Mikki Perez RN  Outcome: Progressing     Problem: Coping  Goal: Pt/Family able to verbalize concerns and demonstrate effective coping strategies  Description: INTERVENTIONS:  1. Assist patient/family to identify coping skills, available support systems and cultural and spiritual values  2. Provide emotional support, including active listening and acknowledgement of concerns of patient and caregivers  3. Reduce environmental stimuli, as able  4. Instruct patient/family in relaxation techniques, as appropriate  5. Assess for spiritual pain/suffering and initiate Spiritual Care, Psychosocial Clinical Specialist consults as needed  1/2/2025 2104 by Mikki Perez RN  Outcome: Progressing     Problem: Depression/Self Harm  Goal: Effect of psychiatric condition will be minimized and patient will be protected from self harm  Description: INTERVENTIONS:  1. Assess impact of patient's symptoms on level of functioning, self care needs and offer support as indicated  2. Assess patient/family knowledge of depression, impact on illness and need for teaching  3. Provide emotional support, presence and reassurance  4. Assess for possible suicidal thoughts or ideation. If patient expresses suicidal thoughts or statements do not leave alone, initiate Suicide Precautions, move to a room close to the nursing station and obtain sitter  5. Initiate consults as appropriate with Mental Health Professional, Spiritual Care, Psychosocial CNS, and consider a recommendation to the LIP for a Psychiatric Consultation  1/2/2025 2104 by Mikki Perez RN  Outcome: Progressing     Patient denies suicidal ideation, homicidal ideations and hallucinations.  Patient presents

## 2025-01-03 NOTE — TRANSITION OF CARE
Valproic Dose amount Unknown     Valproic Date last dose UNK^Unknown^L     Valproic Time last dose UNK^Unknown^L    EKG 12 Lead   Result Value Ref Range    Ventricular Rate 71 BPM    Atrial Rate 71 BPM    P-R Interval 176 ms    QRS Duration 98 ms    Q-T Interval 366 ms    QTc Calculation (Bazett) 397 ms    P Axis 63 degrees    R Axis 83 degrees    T Axis 62 degrees       Immunizations administered during this encounter:   Immunization History   Administered Date(s) Administered    Influenza, AFLURIA, FLUZONE, (age2 y+), IM, Trivalent MDV, 0.5mL 01/03/2025     Influenza Vaccination Status: Influenza vaccine was given during this hospitalization.    Screening for Metabolic Disorders for Patients on Antipsychotic Medications  (Data obtained from the EMR)    Estimated Body Mass Index  Body mass index is 21.83 kg/m².      Vital Signs/Blood Pressure  BP (!) 101/58   Pulse 71   Temp 98.7 °F (37.1 °C) (Temporal)   Resp 16   Ht 1.88 m (6' 2\")   Wt 77.1 kg (170 lb)   SpO2 98%   BMI 21.83 kg/m²      Fasting Blood Glucose or Hemoglobin A1c  No results found for: \"GLU\", \"GLUCPOC\"    Hemoglobin A1C   Date Value Ref Range Status   07/03/2024 5.6 4.0 - 5.6 % Final       Discharge Diagnosis: Schizophrenia (HCC) Acute exacerbation of chronic paranoid schizophrenia (HCC) Paranoid schizophrenia Accidental overdose (HCC) Cannabis abuse     Discharge Plan/Destination: Home    Discharge Medication List and Instructions:      Medication List        START taking these medications      nicotine 21 MG/24HR  Commonly known as: NICODERM CQ  Place 1 patch onto the skin daily  Start taking on: January 4, 2025     risperiDONE ER subcutaneous injection  Commonly known as: UZEDY  Inject 0.28 mLs into the skin every 30 days Will be due for 100 mg injection 2/3/2025  Start taking on: February 2, 2025            CHANGE how you take these medications      * divalproex 500 MG DR tablet  Commonly known as: DEPAKOTE  Take 1 tablet by mouth every  evening  What changed: You were already taking a medication with the same name, and this prescription was added. Make sure you understand how and when to take each.     * divalproex 250 MG DR tablet  Commonly known as: DEPAKOTE  Take 1 tablet by mouth daily  Start taking on: January 4, 2025  What changed: when to take this           * This list has 2 medication(s) that are the same as other medications prescribed for you. Read the directions carefully, and ask your doctor or other care provider to review them with you.                CONTINUE taking these medications      benztropine 2 MG tablet  Commonly known as: COGENTIN  Take 1 tablet by mouth nightly            STOP taking these medications      OLANZapine 20 MG tablet  Commonly known as: ZYPREXA               Where to Get Your Medications        These medications were sent to [facility] [address] - P [phone] - F [phone]  [facility] [id number]      Phone: [phone]   benztropine 2 MG tablet  divalproex 250 MG DR tablet  divalproex 500 MG DR tablet       You can get these medications from any pharmacy    Bring a paper prescription for each of these medications  risperiDONE ER subcutaneous injection  You don't need a prescription for these medications  nicotine 21 MG/24HR         Unresulted Labs (24h ago, onward)       Start     Ordered    01/06/25 0600  Valproic Acid Level, Total  ONE TIME,   TIMED         01/02/25 1302                    To obtain results of studies pending at discharge, please contact [phone]    Follow-up Information       Follow up With Specialties Details Why Contact Roberto Jeffrey  Go on 1/17/2025 You have a mental health crisis support appointment on 1/17 @8am. Max Jeffrey    [address] [place] [address]    Phone: [phone]    Phone Access Center: [phone]    Fax: [phone]    Max Jeffrey

## 2025-01-03 NOTE — PROGRESS NOTES
CLINICAL PHARMACY NOTE: MEDS TO BEDS    Total # of Prescriptions Filled: 3   The following medications were delivered to the patient:  Divalproex dr 500 mg  Divalproex dr 250 mg  Benztropine 2 mg    Additional Documentation:   RN picked up in pharmacy

## 2025-01-03 NOTE — PROGRESS NOTES
[facility]  Discharge Note    Pt discharged with followings belongings:   Dental Appliances: None  Vision - Corrective Lenses: Eyeglasses, At bedside  Hearing Aid: None  Jewelry: Necklace  Body Piercings Removed: N/A  Clothing: Pants, Socks, Footwear, Pajamas, Shirt, Undergarments  Other Valuables: Wallet, Keys, Personal Toiletries, Other (Comment) (ID, social security card. 2 notebooks, plastic cup, hairbrush, lotion)     Valuables sent home with N/A or returned to patient.   Patient educated on aftercare instructions: Yes    Information faxed to N/A by N/A  at 11:34 AM.  Patient verbalize understanding of AVS:  Yes.      Status EXAM upon discharge:  Mental Status and Behavioral Exam  Normal: No  Level of Assistance: Independent/Self  Facial Expression: Flat  Affect: Congruent  Level of Consciousness: Alert  Frequency of Checks: 4 times per hour, close  Mood:Normal: No  Mood: Sad, Anxious  Motor Activity:Normal: Yes  Motor Activity: Decreased  Eye Contact: Good  Observed Behavior: Cooperative, Withdrawn  Sexual Misconduct History: Current - no  Preception: Lancing to person, Lancing to place, Lancing to time, Lancing to situation  Attention:Normal: Yes  Attention: Others (comment) (WNL)  Thought Processes: Blocking  Thought Content:Normal: Yes  Thought Content: Poverty of content  Depression Symptoms: Isolative, Loss of interest  Anxiety Symptoms: Generalized  María Symptoms: No problems reported or observed.  Hallucinations: None  Delusions: No  Delusions: Paranoid  Memory:Normal: No  Memory: Poor recent  Insight and Judgment: No  Insight and Judgment: Other (comment) (impaired)    Tobacco Screening:  Practical Counseling, on admission, liliana X, if applicable and completed (first 3 are required if patient doesn't refuse):            (X) Recognizing danger situations (included triggers and roadblocks)                    (X) Coping skills (new ways to manage stress,relaxation techniques, changing

## 2025-01-03 NOTE — CARE COORDINATION
Ivan called pts mother Lorraine [phone] (SUSANA signed) to inform her of a potential discharge for today 1/3. No answer, ivan left a voicemail requesting a call back.

## 2025-01-03 NOTE — PLAN OF CARE
Patient denies suicidal ideation, homicidal ideation, auditory/visual hallucinations. Denies anxiety and depression. Denies racing thoughts. No paranoia or delusions reported or observed. Appears flat, anxious, friendly, and cooperative during assessment. Reports appetite and sleep are good. Patient is taking medications without issues and is attending groups. Patient received Uzedy 100mg injection today (01/03/25) without issues. Remains in control of behaviors.        Problem: Anxiety  Goal: Will report anxiety at manageable levels  Description: INTERVENTIONS:  1. Administer medication as ordered  2. Teach and rehearse alternative coping skills  3. Provide emotional support with 1:1 interaction with staff  Outcome: Progressing     Problem: Coping  Goal: Pt/Family able to verbalize concerns and demonstrate effective coping strategies  Description: INTERVENTIONS:  1. Assist patient/family to identify coping skills, available support systems and cultural and spiritual values  2. Provide emotional support, including active listening and acknowledgement of concerns of patient and caregivers  3. Reduce environmental stimuli, as able  4. Instruct patient/family in relaxation techniques, as appropriate  5. Assess for spiritual pain/suffering and initiate Spiritual Care, Psychosocial Clinical Specialist consults as needed  Outcome: Progressing     Problem: Decision Making  Goal: Pt/Family able to effectively weigh alternatives and participate in decision making related to treatment and care  Description: INTERVENTIONS:  1. Determine when there are differences between patient's view, family's view, and healthcare provider's view of condition  2. Facilitate patient and family articulation of goals for care  3. Help patient and family identify pros/cons of alternative solutions  4. Provide information as requested by patient/family  5. Respect patient/family right to receive or not to receive information  6. Serve as a  liaison between patient and family and health care team  7. Initiate Consults from Ethics, Palliative Care or initiate Family Care Conference as is appropriate  Outcome: Progressing     Problem: Confusion  Goal: Confusion, delirium, dementia, or psychosis is improved or at baseline  Description: INTERVENTIONS:  1. Assess for possible contributors to thought disturbance, including medications, impaired vision or hearing, underlying metabolic abnormalities, dehydration, psychiatric diagnoses, and notify attending LIP  2. [place] high risk fall precautions, as indicated  3. Provide frequent short contacts to provide reality reorientation, refocusing and direction  4. Decrease environmental stimuli, including noise as appropriate  5. Monitor and intervene to maintain adequate nutrition, hydration, elimination, sleep and activity  6. If unable to ensure safety without constant attention obtain sitter and review sitter guidelines with assigned personnel  7. Initiate Psychosocial CNS and Spiritual Care consult, as indicated  Outcome: Progressing     Problem: Behavior  Goal: Pt/Family maintain appropriate behavior and adhere to behavioral management agreement, if implemented  Description: INTERVENTIONS:  1. Assess patient/family's coping skills and  non-compliant behavior (including use of illegal substances)  2. Notify security of behavior or suspected illegal substances which indicate the need for search of the family and/or belongings  3. Encourage verbalization of thoughts and concerns in a socially appropriate manner  4. Utilize positive, consistent limit setting strategies supporting safety of patient, staff and others  5. Encourage participation in the decision making process about the behavioral management agreement  6. If a visitor's behavior poses a threat to safety call refer to organization policy.  7. Initiate consult with , Psychosocial CNS, Spiritual Care as appropriate  Outcome: Progressing

## 2025-01-03 NOTE — DISCHARGE SUMMARY
DISCHARGE SUMMARY      Patient ID:  Iam Cagle  [id number]  39 y.o.  1985    Admit date: 12/28/2024    Discharge date and time: 1/3/2025    Admitting Physician: Pam Guzmán MD     Discharge Physician: Dr Ramirez MUÑOZ    Discharge Diagnoses:   Patient Active Problem List   Diagnosis    Accidental overdose    Schizophrenia (HCC)    Symptoms of gastroesophageal reflux    Acute exacerbation of chronic paranoid schizophrenia (HCC)    Cannabis abuse    Paranoid schizophrenia (HCC)       Admission Condition: poor    Discharged Condition: stable    Admission Circumstance: Patient name: Iam Cagle  Patient's past mental health and addiction history: History of schizophrenia with multiple previous inpatient psychiatric hospitalization  Patient's presentation to the ED and why the patient needs admission: Auditory and visual hallucinations  Legal status:  []  Voluntary  [x]  Involuntary  []  Probate  Triggering/precipitating events: Noncompliance with medication  Duration of triggering/precipitating events: Unknown      PAST MEDICAL/PSYCHIATRIC HISTORY:   Past Medical History:   Diagnosis Date    ADD (attention deficit disorder)     Bipolar 1 disorder (HCC)        FAMILY/SOCIAL HISTORY:  History reviewed. No pertinent family history.  Social History     Socioeconomic History    Marital status: Single     Spouse name: Not on file    Number of children: Not on file    Years of education: Not on file    Highest education level: Not on file   Occupational History    Occupation:    Tobacco Use    Smoking status: Every Day     Current packs/day: 0.50     Average packs/day: 0.5 packs/day for 8.0 years (4.0 ttl pk-yrs)     Types: Cigarettes    Smokeless tobacco: Never    Tobacco comments:     Smokes 8-9 cigarettes / day   Vaping Use    Vaping status: Never Used   Substance and Sexual Activity    Alcohol use: Yes     Comment: occasionally    Drug use: No    Sexual activity: Not on file   Other Topics Concern     transcribed using voice recognition software. Every effort was made to ensure accuracy; however, inadvertent computerized transcription errors may be present.     TIME SPEND - 35 MINUTES TO COMPLETE THE EVALUATION, DISCHARGE SUMMARY, MEDICATION RECONCILIATION AND FOLLOW UP CARE     Signed:  OC Song - CNP  1/3/2025  10:49 AM

## 2025-01-04 ENCOUNTER — FOLLOWUP TELEPHONE ENCOUNTER (OUTPATIENT)
Dept: PSYCHIATRY | Age: 40
End: 2025-01-04

## 2025-01-17 ENCOUNTER — CASE MANAGEMENT (OUTPATIENT)
Dept: PSYCHIATRY | Age: 40
End: 2025-01-17

## 2025-01-17 NOTE — PROGRESS NOTES
SW received call from [facility] stating that they did not receive pt discharge paperwork. Per chart, pt was referred to [facility] during recent admission. RACHAEL faxed discharge paperwork to [facility].

## 2025-07-09 ENCOUNTER — HOSPITAL ENCOUNTER (EMERGENCY)
Age: 40
Discharge: OTHER INSTITUTION | End: 2025-07-10
Attending: STUDENT IN AN ORGANIZED HEALTH CARE EDUCATION/TRAINING PROGRAM
Payer: COMMERCIAL

## 2025-07-09 DIAGNOSIS — F29 PSYCHOSIS, UNSPECIFIED PSYCHOSIS TYPE (HCC): Primary | ICD-10-CM

## 2025-07-09 PROCEDURE — 99285 EMERGENCY DEPT VISIT HI MDM: CPT

## 2025-07-10 VITALS
BODY MASS INDEX: 21.82 KG/M2 | HEIGHT: 74 IN | TEMPERATURE: 97.9 F | WEIGHT: 170 LBS | DIASTOLIC BLOOD PRESSURE: 77 MMHG | OXYGEN SATURATION: 97 % | HEART RATE: 81 BPM | RESPIRATION RATE: 16 BRPM | SYSTOLIC BLOOD PRESSURE: 116 MMHG

## 2025-07-10 LAB
ALBUMIN SERPL-MCNC: 4.5 G/DL (ref 3.5–5.2)
ALP SERPL-CCNC: 55 U/L (ref 40–129)
ALT SERPL-CCNC: 17 U/L (ref 0–50)
AMPHET UR QL SCN: NEGATIVE
ANION GAP SERPL CALCULATED.3IONS-SCNC: 20 MMOL/L (ref 7–16)
APAP SERPL-MCNC: <5 UG/ML (ref 10–30)
AST SERPL-CCNC: 38 U/L (ref 0–50)
BARBITURATES UR QL SCN: NEGATIVE
BASOPHILS # BLD: 0.03 K/UL (ref 0–0.2)
BASOPHILS NFR BLD: 0 % (ref 0–2)
BENZODIAZ UR QL: NEGATIVE
BILIRUB SERPL-MCNC: <0.2 MG/DL (ref 0–1.2)
BUN SERPL-MCNC: 10 MG/DL (ref 6–20)
BUPRENORPHINE UR QL: NEGATIVE
CALCIUM SERPL-MCNC: 9.1 MG/DL (ref 8.6–10)
CANNABINOIDS UR QL SCN: POSITIVE
CHLORIDE SERPL-SCNC: 102 MMOL/L (ref 98–107)
CO2 SERPL-SCNC: 17 MMOL/L (ref 22–29)
COCAINE UR QL SCN: NEGATIVE
CREAT SERPL-MCNC: 1.3 MG/DL (ref 0.7–1.2)
DATE LAST DOSE: NORMAL
EKG ATRIAL RATE: 130 BPM
EKG P AXIS: 63 DEGREES
EKG P-R INTERVAL: 146 MS
EKG Q-T INTERVAL: 288 MS
EKG QRS DURATION: 94 MS
EKG QTC CALCULATION (BAZETT): 423 MS
EKG R AXIS: 82 DEGREES
EKG T AXIS: 36 DEGREES
EKG VENTRICULAR RATE: 130 BPM
EOSINOPHIL # BLD: 0 K/UL (ref 0.05–0.5)
EOSINOPHILS RELATIVE PERCENT: 0 % (ref 0–6)
ERYTHROCYTE [DISTWIDTH] IN BLOOD BY AUTOMATED COUNT: 13.5 % (ref 11.5–15)
ETHANOLAMINE SERPL-MCNC: <10 MG/DL (ref 0–0.08)
FENTANYL UR QL: NEGATIVE
GFR, ESTIMATED: 75 ML/MIN/1.73M2
GLUCOSE SERPL-MCNC: 125 MG/DL (ref 74–99)
HCT VFR BLD AUTO: 34.5 % (ref 37–54)
HGB BLD-MCNC: 11.8 G/DL (ref 12.5–16.5)
IMM GRANULOCYTES # BLD AUTO: 0.05 K/UL (ref 0–0.58)
IMM GRANULOCYTES NFR BLD: 0 % (ref 0–5)
LYMPHOCYTES NFR BLD: 1.79 K/UL (ref 1.5–4)
LYMPHOCYTES RELATIVE PERCENT: 13 % (ref 20–42)
MAGNESIUM SERPL-MCNC: 1.8 MG/DL (ref 1.6–2.6)
MCH RBC QN AUTO: 29.8 PG (ref 26–35)
MCHC RBC AUTO-ENTMCNC: 34.2 G/DL (ref 32–34.5)
MCV RBC AUTO: 87.1 FL (ref 80–99.9)
METHADONE UR QL: NEGATIVE
MONOCYTES NFR BLD: 1.19 K/UL (ref 0.1–0.95)
MONOCYTES NFR BLD: 9 % (ref 2–12)
NEUTROPHILS NFR BLD: 77 % (ref 43–80)
NEUTS SEG NFR BLD: 10.29 K/UL (ref 1.8–7.3)
OPIATES UR QL SCN: NEGATIVE
OXYCODONE UR QL SCN: NEGATIVE
PCP UR QL SCN: NEGATIVE
PLATELET # BLD AUTO: 155 K/UL (ref 130–450)
PMV BLD AUTO: 10.7 FL (ref 7–12)
POTASSIUM SERPL-SCNC: 3.3 MMOL/L (ref 3.5–5.1)
PROT SERPL-MCNC: 6.4 G/DL (ref 6.4–8.3)
RBC # BLD AUTO: 3.96 M/UL (ref 3.8–5.8)
SALICYLATES SERPL-MCNC: <0.5 MG/DL (ref 0–30)
SODIUM SERPL-SCNC: 139 MMOL/L (ref 136–145)
TEST INFORMATION: ABNORMAL
TME LAST DOSE: NORMAL H
TOXIC TRICYCLIC SC,BLOOD: NEGATIVE
VALPROATE SERPL-MCNC: 53 UG/ML (ref 50–100)
VANCOMYCIN DOSE: NORMAL MG
WBC OTHER # BLD: 13.4 K/UL (ref 4.5–11.5)

## 2025-07-10 PROCEDURE — 80179 DRUG ASSAY SALICYLATE: CPT

## 2025-07-10 PROCEDURE — 93010 ELECTROCARDIOGRAM REPORT: CPT | Performed by: INTERNAL MEDICINE

## 2025-07-10 PROCEDURE — 6370000000 HC RX 637 (ALT 250 FOR IP): Performed by: STUDENT IN AN ORGANIZED HEALTH CARE EDUCATION/TRAINING PROGRAM

## 2025-07-10 PROCEDURE — 85025 COMPLETE CBC W/AUTO DIFF WBC: CPT

## 2025-07-10 PROCEDURE — 93005 ELECTROCARDIOGRAM TRACING: CPT | Performed by: STUDENT IN AN ORGANIZED HEALTH CARE EDUCATION/TRAINING PROGRAM

## 2025-07-10 PROCEDURE — 80164 ASSAY DIPROPYLACETIC ACD TOT: CPT

## 2025-07-10 PROCEDURE — 83735 ASSAY OF MAGNESIUM: CPT

## 2025-07-10 PROCEDURE — G0480 DRUG TEST DEF 1-7 CLASSES: HCPCS

## 2025-07-10 PROCEDURE — 80143 DRUG ASSAY ACETAMINOPHEN: CPT

## 2025-07-10 PROCEDURE — 80307 DRUG TEST PRSMV CHEM ANLYZR: CPT

## 2025-07-10 PROCEDURE — 80053 COMPREHEN METABOLIC PANEL: CPT

## 2025-07-10 RX ORDER — POTASSIUM CHLORIDE 1.5 G/1.58G
40 POWDER, FOR SOLUTION ORAL ONCE
Status: DISCONTINUED | OUTPATIENT
Start: 2025-07-10 | End: 2025-07-10

## 2025-07-10 RX ORDER — POTASSIUM CHLORIDE 1500 MG/1
40 TABLET, EXTENDED RELEASE ORAL ONCE
Status: COMPLETED | OUTPATIENT
Start: 2025-07-10 | End: 2025-07-10

## 2025-07-10 RX ADMIN — POTASSIUM CHLORIDE 40 MEQ: 20 TABLET, EXTENDED RELEASE ORAL at 06:27

## 2025-07-10 NOTE — VIRTUAL HEALTH
Iam Cagle  [id number]  1985     Social Work Behavioral Health Crisis Assessment    07/10/25    Chief Complaint: Patient found terrorizing neighborhood, has been off psych meds, combative on scene) EMS gave 5mg VERSED IM, 250 KETAMINE     HPI: Patient is a 39 y.o. Black /   White (non-) male who presents for a psych eval. Patient presented to the ED on 07/10/25 from the community.     Patient awake and agreeable to assessment.     Patient has a history of acute psychosis and Schizophrenia. He is currently experiencing auditory and visual hallucinations. He disclosed he does not consistently take his medications as prescribed.     Patient is disoriented with loose associations and flight of ideas. His responses are mostly illogical    Record reviewed and attempted to obtain collateral.     Collateral: Contacted patient's Mother at phone [phone] - no answer     Past Psychiatric History:  Previous Diagnoses/symptoms: Acute Psychosis, Bipolar I, Auditory and Visual Hallucinations, Schizophrenia, Cannabis Use, Accidental Overdose  Previous suicide attempts/self-harm: Per record, He has attempted suicide by overdose in the past   Inpatient psychiatric hospitalizations: yes  Current outpatient psychiatric provider: [facility]  Current therapist: States not in therapy  Previous psychiatric medication trials: ZYPREXA   Current psychiatric medications:  risperiDONE ER, Depakote,   Family Psychiatric History: Denies    Sleep Hours: BELEN    Sleep concerns: BELEN    Use of sleep medications: denies    Substance Abuse History:  Tobacco: Endorses daily  Alcohol: Endorses not often  Marijuana: Endorses sometimes  Stimulant: Denies  Opiates: Denies  Benzodiazepine: Denies  Other illicit drug usage: Denies  History of substance/alcohol abuse treatment: Denies    Social History:  Education: H.S.  Living Situation/Interest: with family  Marital/Committed relationship and parenting hx: single  Occupation:

## 2025-07-10 NOTE — VIRTUAL HEALTH
Received request for Telepsychiatry evaluation.  Delay Information, Chart reviewed  Provider attempted to meet with patient  but he was not able to stay awake.  Case discussed with RN and was made aware that on the way to the ED, EMS gave patient 5mg VERSED IM and 250 KETAMINE.   We will delay evaluation until patient is awake and is able to participate and engage in assessment      Iam Cagle, was evaluated through a synchronous (real-time) audio-video encounter. The patient (and/or guardian if applicable) is aware that this is a billable service, which includes applicable co-pays. This virtual visit was conducted with patient's (and/or legal guardian's) consent. Patient identification was verified, and a caregiver was present when appropriate.  The patient was located at Facility (Appt Department): [facility] EMERGENCY DEPARTMENT  [address] [address]  Loc: [phone]  The provider was located at Home (City/State): [place]  Confirm you are appropriately licensed, registered, or certified to deliver care in the state where the patient is located as indicated above. If you are not or unsure, please re-schedule the visit: Yes, I confirm.   Consults     Total time spent on this encounter: Not billed by time    --OC Chun CNP on 7/10/2025 at 12:46 AM    An electronic signature was used to authenticate this note.

## 2025-07-10 NOTE — ED PROVIDER NOTES
[facility] EMERGENCY DEPARTMENT  EMERGENCY DEPARTMENT ENCOUNTER    Pt Name: Iam Cagle  MRN: [id number]  Birthdate 1985  Date of evaluation: 7/9/2025  Provider: Norman Michael MD  PCP: Carolann Nice MD  Note Started: 12:00 AM EDT 7/10/25    HPI     Patient is a 39 y.o. male presents with a chief complaint of   Chief Complaint   Patient presents with    Psychiatric Evaluation     (Patient found terrorizing neighborhood, has been off psych meds, combative on scene) EMS gave 5mg VERSED IM, 250 KETAMINE   .    Presenting for psychiatric evaluation.  Patient reportedly has been off of his psych meds and has been very combative on scene.  Patient was given 5 of Versed and 250 of ketamine by EMS.  At this time patient is moving all 4 extremities and yelling nonsensical words will not give a history.    Nursing Notes were all reviewed and agreed with or any disagreements were addressed in the HPI.    History From: patient    Review of Systems   Pertinent positives and negatives as per HPI.     Physical Exam  Vitals and nursing note reviewed.   Constitutional:       General: He is not in acute distress.     Appearance: He is well-developed. He is not ill-appearing.   HENT:      Head: Normocephalic and atraumatic.      Comments: No signs of trauma.  Eyes:      Conjunctiva/sclera: Conjunctivae normal.   Cardiovascular:      Rate and Rhythm: Normal rate and regular rhythm.      Heart sounds: Normal heart sounds. No murmur heard.  Pulmonary:      Effort: Pulmonary effort is normal. No respiratory distress.      Breath sounds: Normal breath sounds. No wheezing or rales.   Abdominal:      General: Bowel sounds are normal.      Palpations: Abdomen is soft.      Tenderness: There is no abdominal tenderness. There is no guarding or rebound.   Musculoskeletal:         General: No tenderness or deformity.      Cervical back: Normal range of motion and neck supple.   Skin:     General: Skin is  medically cleared for admission  Patient's condition is Stable                                       Norman Michael MD  07/10/25 0758       Norman Michael MD  07/10/25 0759

## 2025-07-10 NOTE — ED NOTES
spoke to nurse manager Adiel and confirmed the patient should be referred out due to unknown of any discharges at this time.     called [facility] at [phone] and completed referral.

## 2025-07-10 NOTE — ED NOTES
[facility] UPDATE AT 0840AM    Patient accepted to [facility] by Dr Fitzgerald.     Dual unit room 105-B     N2N is to be called to [phone]    Physicians ETA 10:45-11AM

## 2025-07-14 LAB
DATE LAST DOSE: NORMAL
TME LAST DOSE: NORMAL H
VALPROATE SERPL-MCNC: 59 UG/ML (ref 50–100)
VANCOMYCIN DOSE: NORMAL MG